# Patient Record
Sex: FEMALE | Race: WHITE | NOT HISPANIC OR LATINO | Employment: FULL TIME | URBAN - METROPOLITAN AREA
[De-identification: names, ages, dates, MRNs, and addresses within clinical notes are randomized per-mention and may not be internally consistent; named-entity substitution may affect disease eponyms.]

---

## 2021-11-15 ENCOUNTER — OFFICE VISIT (OUTPATIENT)
Dept: INTERNAL MEDICINE | Facility: CLINIC | Age: 31
End: 2021-11-15

## 2021-11-15 VITALS
OXYGEN SATURATION: 99 % | BODY MASS INDEX: 30.46 KG/M2 | HEART RATE: 72 BPM | SYSTOLIC BLOOD PRESSURE: 128 MMHG | HEIGHT: 68 IN | WEIGHT: 201 LBS | TEMPERATURE: 98.6 F | RESPIRATION RATE: 20 BRPM | DIASTOLIC BLOOD PRESSURE: 80 MMHG

## 2021-11-15 DIAGNOSIS — B37.31 VAGINAL CANDIDIASIS: ICD-10-CM

## 2021-11-15 DIAGNOSIS — F41.8 DEPRESSION WITH ANXIETY: Primary | ICD-10-CM

## 2021-11-15 PROBLEM — E10.9 TYPE 1 DIABETES: Status: ACTIVE | Noted: 2017-08-01

## 2021-11-15 PROBLEM — S83.206A RIGHT KNEE MENISCAL TEAR: Status: RESOLVED | Noted: 2019-02-13 | Resolved: 2021-11-15

## 2021-11-15 PROBLEM — S83.206A RIGHT KNEE MENISCAL TEAR: Status: ACTIVE | Noted: 2019-02-13

## 2021-11-15 LAB
BILIRUB BLD-MCNC: NEGATIVE MG/DL
CLARITY, POC: CLEAR
COLOR UR: YELLOW
EXPIRATION DATE: ABNORMAL
GLUCOSE UR STRIP-MCNC: ABNORMAL MG/DL
KETONES UR QL: ABNORMAL
LEUKOCYTE EST, POC: NEGATIVE
Lab: ABNORMAL
NITRITE UR-MCNC: NEGATIVE MG/ML
PH UR: 5 [PH] (ref 5–8)
PROT UR STRIP-MCNC: NEGATIVE MG/DL
RBC # UR STRIP: NEGATIVE /UL
SP GR UR: 1.03 (ref 1–1.03)
UROBILINOGEN UR QL: NORMAL

## 2021-11-15 PROCEDURE — 99203 OFFICE O/P NEW LOW 30 MIN: CPT | Performed by: NURSE PRACTITIONER

## 2021-11-15 PROCEDURE — 81003 URINALYSIS AUTO W/O SCOPE: CPT | Performed by: NURSE PRACTITIONER

## 2021-11-15 RX ORDER — FLUCONAZOLE 150 MG/1
TABLET ORAL
Qty: 2 TABLET | Refills: 0 | Status: SHIPPED | OUTPATIENT
Start: 2021-11-15 | End: 2021-12-20

## 2021-11-15 RX ORDER — DULOXETIN HYDROCHLORIDE 60 MG/1
CAPSULE, DELAYED RELEASE ORAL
COMMUNITY
Start: 2021-11-04 | End: 2021-11-15 | Stop reason: SDUPTHER

## 2021-11-15 RX ORDER — INSULIN ASPART 100 [IU]/ML
INJECTION, SOLUTION INTRAVENOUS; SUBCUTANEOUS
COMMUNITY
Start: 2012-11-02 | End: 2022-04-25

## 2021-11-15 RX ORDER — PROCHLORPERAZINE 25 MG/1
SUPPOSITORY RECTAL
COMMUNITY
Start: 2021-10-15

## 2021-11-15 RX ORDER — DULOXETIN HYDROCHLORIDE 60 MG/1
60 CAPSULE, DELAYED RELEASE ORAL DAILY
Qty: 90 CAPSULE | Refills: 1 | Status: SHIPPED | OUTPATIENT
Start: 2021-11-15 | End: 2022-02-14 | Stop reason: SDUPTHER

## 2021-11-15 NOTE — PROGRESS NOTES
Subjective    Candice Mckeon is a 31 y.o. female presenting today for   Chief Complaint   Patient presents with   • Establish Care   • Depression     questions about anti-depressants    • Urinary Frequency     burning x wk       History of Present Illness     Candice Mckeon presents today as a new patient to me to establish care.   Prior PCP was Dr. Augustine.  Patient Care Team:  Wilma Barragan APRN as PCP - General (Family Medicine)  Priyanka Ashby MD as Consulting Physician (Endocrinology)  Lucille Mcgarry MD as Consulting Physician (Obstetrics and Gynecology)    Current/chronic health conditions include:    Patient Active Problem List   Diagnosis   • Type 1 diabetes (HCC)   • Depression with anxiety         Current Outpatient Medications:   •  Continuous Blood Gluc Sensor (Dexcom G6 Sensor), CHANGE SENSOR EVERY 10 DAYS, Disp: , Rfl:   •  DULoxetine (CYMBALTA) 60 MG capsule, Take 1 capsule by mouth Daily., Disp: 90 capsule, Rfl: 1  •  insulin aspart (NovoLOG FlexPen) 100 UNIT/ML solution pen-injector sc pen, Inject  under the skin into the appropriate area as directed., Disp: , Rfl:   •  fluconazole (Diflucan) 150 MG tablet, Take one tablet by mouth at the first sign of yeast infection. May repeat in 3 days if symptoms persist., Disp: 2 tablet, Rfl: 0    Mixed anxiety and depression - She has taken medication since 9th grade. This involved panic attacks. Occurred shortly before dx of DM. She took Lexapro for a while and did well with this. Then experienced several deaths in a short period of time. Switched to Prozac. Didn't really notice a difference. Changed to Cymbalta by prior PCP. A lot of situational things that are causing more distress. She characterizes herself as usually enthusiastic. Lacking in motivation. She is not currently seeing a therapist although she has in the past.    New complaints today include:  She notes frequent vaginal candidiasis 2/2 DM. Sts she has been experiencing vaginal  "itching and irritation for approx 2wks.      The following portions of the patient's history were reviewed and updated as appropriate: allergies, current medications, problem list, past medical history, past surgical history, family history, and social history.     Review of Systems   Genitourinary: Positive for vaginal discharge.   Psychiatric/Behavioral: Positive for depressed mood and stress. The patient is nervous/anxious.        Objective    Vitals:    11/15/21 1025   BP: 128/80   Pulse: 72   Resp: 20   Temp: 98.6 °F (37 °C)   TempSrc: Temporal   SpO2: 99%   Weight: 91.2 kg (201 lb)   Height: 172.7 cm (67.99\")     Body mass index is 30.57 kg/m².  Nursing notes and vitals reviewed.    Physical Exam  Constitutional:       General: She is not in acute distress.     Appearance: She is well-developed.   Pulmonary:      Effort: Pulmonary effort is normal.   Neurological:      Mental Status: She is alert.   Psychiatric:         Attention and Perception: She is attentive.         Speech: Speech normal.         Recent Results (from the past 672 hour(s))   POCT urinalysis dipstick, automated    Collection Time: 11/15/21 10:46 AM    Specimen: Urine   Result Value Ref Range    Color Yellow Yellow, Straw, Dark Yellow, Lakisha    Clarity, UA Clear Clear    Specific Gravity  1.030 1.005 - 1.030    pH, Urine 5.0 5.0 - 8.0    Leukocytes Negative Negative    Nitrite, UA Negative Negative    Protein, POC Negative Negative mg/dL    Glucose,  mg/dL (A) Negative, 1000 mg/dL (3+) mg/dL    Ketones, UA Trace (A) Negative    Urobilinogen, UA Normal Normal    Bilirubin Negative Negative    Blood, UA Negative Negative    Lot Number [id number]     Expiration Date 10/31/2022          Assessment and Plan    Diagnoses and all orders for this visit:    1. Depression with anxiety (Primary)  Comments:  - I would like her to re-establish w/ therapist and provided a list of local/virtual providers.  Orders:  -     DULoxetine (CYMBALTA) 60 MG " capsule; Take 1 capsule by mouth Daily.  Dispense: 90 capsule; Refill: 1    2. Vaginal candidiasis  -     POCT urinalysis dipstick, automated  -     fluconazole (Diflucan) 150 MG tablet; Take one tablet by mouth at the first sign of yeast infection. May repeat in 3 days if symptoms persist.  Dispense: 2 tablet; Refill: 0              Medications, including side effects, were discussed with the patient. Patient verbalized understanding.  The plan of care was discussed. All questions were answered. Patient verbalized understanding.

## 2021-11-15 NOTE — PATIENT INSTRUCTIONS
Mental Health and Counseling Services:      [facility] or [facility] Direct - [email or web address]    [facility]:  [phone]; [email or web address]    Glendy Peñaover:  [phone]    Sofia Benson Dustin:  [phone]    Mayuri Chester:  [phone]    [facility]:  [phone]    [facility]:  [phone]    Counseling, Therapy and More, [phone]    [phone]    Rosa Elena Mccray:  [phone]    [phone]    [phone]    Liliana Archuleta:  [phone]    [facility]:  [phone]    [facility]:  [phone]    [phone]    [phone]    [phone]

## 2021-12-20 ENCOUNTER — TELEPHONE (OUTPATIENT)
Dept: INTERNAL MEDICINE | Facility: CLINIC | Age: 31
End: 2021-12-20

## 2021-12-20 DIAGNOSIS — B37.31 VAGINAL CANDIDIASIS: ICD-10-CM

## 2021-12-20 RX ORDER — FLUCONAZOLE 150 MG/1
TABLET ORAL
Qty: 2 TABLET | Refills: 5 | Status: SHIPPED | OUTPATIENT
Start: 2021-12-20 | End: 2022-02-09

## 2021-12-20 RX ORDER — FLUCONAZOLE 150 MG/1
TABLET ORAL
Qty: 2 TABLET | Refills: 0 | Status: SHIPPED | OUTPATIENT
Start: 2021-12-20 | End: 2021-12-20 | Stop reason: SDUPTHER

## 2022-02-14 DIAGNOSIS — F41.8 DEPRESSION WITH ANXIETY: ICD-10-CM

## 2022-02-14 RX ORDER — DULOXETIN HYDROCHLORIDE 60 MG/1
60 CAPSULE, DELAYED RELEASE ORAL DAILY
Qty: 90 CAPSULE | Refills: 1 | Status: SHIPPED | OUTPATIENT
Start: 2022-02-14 | End: 2022-11-18

## 2022-02-15 ENCOUNTER — OFFICE VISIT (OUTPATIENT)
Dept: INTERNAL MEDICINE | Facility: CLINIC | Age: 32
End: 2022-02-15

## 2022-02-15 VITALS
OXYGEN SATURATION: 99 % | HEART RATE: 107 BPM | SYSTOLIC BLOOD PRESSURE: 124 MMHG | DIASTOLIC BLOOD PRESSURE: 76 MMHG | BODY MASS INDEX: 31.87 KG/M2 | TEMPERATURE: 98.7 F | RESPIRATION RATE: 20 BRPM | HEIGHT: 68 IN | WEIGHT: 210.3 LBS

## 2022-02-15 DIAGNOSIS — H66.012 NON-RECURRENT ACUTE SUPPURATIVE OTITIS MEDIA OF LEFT EAR WITH SPONTANEOUS RUPTURE OF TYMPANIC MEMBRANE: Primary | ICD-10-CM

## 2022-02-15 PROCEDURE — 99213 OFFICE O/P EST LOW 20 MIN: CPT | Performed by: NURSE PRACTITIONER

## 2022-02-15 RX ORDER — PSEUDOEPHEDRINE HCL 120 MG/1
120 TABLET, FILM COATED, EXTENDED RELEASE ORAL EVERY 12 HOURS
Qty: 20 TABLET | Refills: 0 | Status: SHIPPED | OUTPATIENT
Start: 2022-02-15 | End: 2022-10-16

## 2022-02-15 RX ORDER — FLUTICASONE PROPIONATE 50 MCG
2 SPRAY, SUSPENSION (ML) NASAL DAILY
Qty: 16 G | Refills: 0 | Status: SHIPPED | OUTPATIENT
Start: 2022-02-15 | End: 2022-10-16

## 2022-02-15 NOTE — PROGRESS NOTES
"Subjective   Candice Mckeon is a 31 y.o. female presenting today for follow up of   Chief Complaint   Patient presents with   • Ear Problem     ruptured ear drum        History of Present Illness     Patient Active Problem List   Diagnosis   • Type 1 diabetes (HCC)   • Depression with anxiety       Outpatient Medications Marked as Taking for the 2/15/22 encounter (Office Visit) with Wilma Barragan APRN   Medication Sig Dispense Refill   • amoxicillin-clavulanate (AUGMENTIN) 875-125 MG per tablet Take 1 tablet by mouth 2 (Two) Times a Day for 10 days. 20 tablet 0   • Continuous Blood Gluc Sensor (Dexcom G6 Sensor) CHANGE SENSOR EVERY 10 DAYS     • DULoxetine (CYMBALTA) 60 MG capsule Take 1 capsule by mouth Daily. 90 capsule 1   • insulin aspart (NovoLOG FlexPen) 100 UNIT/ML solution pen-injector sc pen Inject  under the skin into the appropriate area as directed.     • ofloxacin (FLOXIN) 0.3 % otic solution Administer 5 drops into the left ear Daily for 7 days. 5 mL 0     Awoke overnight one week ago with worst pain of her life in her L ear. Went to [facility]. Dx was OM and she sts she was informed that TM was ruptured. Tx w/ Augmentin and Floxin otic. Pain has subsided. There is a constant ringing and pulsation. Her hearing is decreased.    Had been to [facility] the week prior w/ sinus/URI S&S. Was advised to tx w/ OTCs. Several family members ill w/ same.    The following portions of the patient's history were reviewed and updated as appropriate: allergies, current medications, past family history, past medical history, past social history, past surgical history and problem list.        Objective   Vitals:    02/15/22 1601   BP: 124/76   Pulse: 107   Resp: 20   Temp: 98.7 °F (37.1 °C)   TempSrc: Temporal   SpO2: 99%   Weight: 95.4 kg (210 lb 4.8 oz)   Height: 172.7 cm (67.99\")       BP Readings from Last 3 Encounters:   02/15/22 124/76   02/09/22 140/89   11/15/21 128/80        Wt Readings from Last 3 Encounters: "   02/15/22 95.4 kg (210 lb 4.8 oz)   02/09/22 95.3 kg (210 lb)   11/15/21 91.2 kg (201 lb)        Body mass index is 31.98 kg/m².  Nursing notes and vitals reviewed.    Physical Exam  Constitutional:       General: She is not in acute distress.     Appearance: She is well-developed.   HENT:      Right Ear: Hearing, tympanic membrane, ear canal and external ear normal.      Left Ear: Ear canal and external ear normal. A middle ear effusion is present. Tympanic membrane is bulging. Tympanic membrane is not perforated or erythematous.      Nose: Mucosal edema present.      Left Turbinates: Enlarged and swollen.      Right Sinus: No maxillary sinus tenderness or frontal sinus tenderness.      Left Sinus: No maxillary sinus tenderness or frontal sinus tenderness.      Mouth/Throat:      Mouth: Mucous membranes are moist.      Pharynx: Oropharynx is clear. Uvula midline.   Neck:      Thyroid: No thyroid mass or thyromegaly.   Cardiovascular:      Rate and Rhythm: Regular rhythm.      Pulses: Normal pulses.      Heart sounds: S1 normal and S2 normal. No murmur heard.  No friction rub. No gallop.    Pulmonary:      Effort: Pulmonary effort is normal.      Breath sounds: Normal breath sounds. No wheezing, rhonchi or rales.   Musculoskeletal:      Cervical back: Neck supple.   Lymphadenopathy:      Cervical: No cervical adenopathy.   Neurological:      Mental Status: She is alert and oriented to person, place, and time.      Cranial Nerves: No cranial nerve deficit.      Sensory: No sensory deficit.   Psychiatric:         Attention and Perception: She is attentive.         Speech: Speech normal.         Behavior: Behavior normal.         No results found for this or any previous visit (from the past 672 hour(s)).      Assessment/Plan   Diagnoses and all orders for this visit:    1. Non-recurrent acute suppurative otitis media of left ear with spontaneous rupture of tympanic membrane (Primary)  Comments:  - perforation appears  closed  - finish Augmentin  Orders:  -     pseudoephedrine (Sudafed 12 Hour) 120 MG 12 hr tablet; Take 1 tablet by mouth Every 12 (Twelve) Hours.  Dispense: 20 tablet; Refill: 0  -     fluticasone (Flonase) 50 MCG/ACT nasal spray; 2 sprays into the nostril(s) as directed by provider Daily.  Dispense: 16 g; Refill: 0              Medications, including side effects, were discussed with the patient. Patient verbalized understanding.  The plan of care was discussed. All questions were answered. Patient verbalized understanding.      Will refer to ENT if S&S persist.

## 2022-02-21 ENCOUNTER — TELEPHONE (OUTPATIENT)
Dept: INTERNAL MEDICINE | Facility: CLINIC | Age: 32
End: 2022-02-21

## 2022-02-21 NOTE — TELEPHONE ENCOUNTER
Caller: Candice Mckeon    Relationship: Self    Best call back number: [phone]    What is the medical concern/diagnosis: LEFT EAR CLOGGED    What specialty or service is being requested: ENT      What is the office location: SOMEONE NEAR [place] OR [place] WOULD BE BEST      Any additional details: PATIENT STATES THAT HER EAR IS NOT ANY BETTER AND SHE WOULD LIKE THE REFERRAL DISCUSSED AT HER LAST APPOINTMENT    PLEASE CALL AND ADVISE

## 2022-02-22 DIAGNOSIS — H91.92 HEARING LOSS OF LEFT EAR, UNSPECIFIED HEARING LOSS TYPE: ICD-10-CM

## 2022-02-22 DIAGNOSIS — H66.012 NON-RECURRENT ACUTE SUPPURATIVE OTITIS MEDIA OF LEFT EAR WITH SPONTANEOUS RUPTURE OF TYMPANIC MEMBRANE: Primary | ICD-10-CM

## 2022-08-31 ENCOUNTER — TELEPHONE (OUTPATIENT)
Dept: INTERNAL MEDICINE | Facility: CLINIC | Age: 32
End: 2022-08-31

## 2022-09-01 ENCOUNTER — TELEPHONE (OUTPATIENT)
Dept: INTERNAL MEDICINE | Facility: CLINIC | Age: 32
End: 2022-09-01

## 2022-09-01 NOTE — TELEPHONE ENCOUNTER
Pt is an insulin-dependent diabetic and would likely qualify for Paxlovid. I went ahead and scheduled her for a video visit at 1530 to discuss. Please call and make her aware of her appt and instruct her on how to access that.

## 2022-09-01 NOTE — TELEPHONE ENCOUNTER
Hub to share with pt, I called and left vm on pt phone in regards to her video visit today at 1530 with Wilma to discuss Paxlovid treatment for Covid.

## 2022-11-18 DIAGNOSIS — F41.8 DEPRESSION WITH ANXIETY: ICD-10-CM

## 2022-11-18 RX ORDER — DULOXETIN HYDROCHLORIDE 60 MG/1
CAPSULE, DELAYED RELEASE ORAL
Qty: 90 CAPSULE | Refills: 1 | Status: SHIPPED | OUTPATIENT
Start: 2022-11-18

## 2022-11-18 NOTE — TELEPHONE ENCOUNTER
Rx Refill Note  Requested Prescriptions     Pending Prescriptions Disp Refills    DULoxetine (CYMBALTA) 60 MG capsule [Pharmacy Med Name: DULOXETINE HCL DR 60 MG CAP] 90 capsule 1     Sig: TAKE 1 CAPSULE BY MOUTH EVERY DAY      Last office visit with prescribing clinician: 2/15/2022      Next office visit with prescribing clinician: Visit date not found            Kay Mason MA  11/18/22, 14:26 EST

## 2023-05-17 DIAGNOSIS — F41.8 DEPRESSION WITH ANXIETY: ICD-10-CM

## 2023-05-17 RX ORDER — DULOXETIN HYDROCHLORIDE 60 MG/1
CAPSULE, DELAYED RELEASE ORAL
Qty: 90 CAPSULE | Refills: 1 | OUTPATIENT
Start: 2023-05-17

## 2023-05-18 ENCOUNTER — TELEPHONE (OUTPATIENT)
Dept: INTERNAL MEDICINE | Facility: CLINIC | Age: 33
End: 2023-05-18
Payer: COMMERCIAL

## 2023-05-18 DIAGNOSIS — F41.8 DEPRESSION WITH ANXIETY: ICD-10-CM

## 2023-05-18 RX ORDER — DULOXETIN HYDROCHLORIDE 60 MG/1
60 CAPSULE, DELAYED RELEASE ORAL DAILY
Qty: 90 CAPSULE | Refills: 0 | Status: SHIPPED | OUTPATIENT
Start: 2023-05-18

## 2023-05-18 RX ORDER — DULOXETIN HYDROCHLORIDE 60 MG/1
60 CAPSULE, DELAYED RELEASE ORAL DAILY
Qty: 90 CAPSULE | Refills: 0 | OUTPATIENT
Start: 2023-05-18

## 2023-05-18 NOTE — TELEPHONE ENCOUNTER
OK FOR HUB TO READ  LVM FOR PATIENT TO CALL IN.  WE RECEIVED A MED FILL REQUEST BUT HAS BEEN OVER A YEAR SINCE BEING SEEN. PATIENT NEEDS A APPT FOR ANNUAL PHYSICAL.

## 2023-05-18 NOTE — TELEPHONE ENCOUNTER
HUB RELAYED MESSAGE. SOONEST AVAILABLE APPOINTMENT SCHEDULED. REFILL REQUEST SENT TO GET MEDICATION TO LAST UNTIL PHYSICAL

## 2023-05-18 NOTE — TELEPHONE ENCOUNTER
Caller: Mike Candice E    Relationship: Self    Best call back number: [phone]    Requested Prescriptions:   Requested Prescriptions     Pending Prescriptions Disp Refills   • DULoxetine (CYMBALTA) 60 MG capsule 90 capsule 0     Sig: Take 1 capsule by mouth Daily.        Pharmacy where request should be sent: [facility]ACY #[id number] - [facility] [address] [address] AT [facility] [address] [address] - [phone]  - [phone]      Last office visit with prescribing clinician: 2/15/2022   Last telemedicine visit with prescribing clinician: 5/18/2023   Next office visit with prescribing clinician: 6/13/2023     Additional details provided by patient: PATIENT SCHEDULED SOONEST AVAILABLE PHYSICAL, BUT WILL NEED A REFILL OF MEDICATION BEFORE THAT DATE    Does the patient have less than a 3 day supply:  [] Yes  [x] No    Would you like a call back once the refill request has been completed: [] Yes [x] No    If the office needs to give you a call back, can they leave a voicemail: [] Yes [x] No    Christiano Gutierrez Rep   05/18/23 11:39 EDT

## 2023-06-13 ENCOUNTER — OFFICE VISIT (OUTPATIENT)
Dept: INTERNAL MEDICINE | Facility: CLINIC | Age: 33
End: 2023-06-13
Payer: COMMERCIAL

## 2023-06-13 VITALS
WEIGHT: 211.4 LBS | TEMPERATURE: 98 F | HEIGHT: 68 IN | OXYGEN SATURATION: 96 % | BODY MASS INDEX: 32.04 KG/M2 | HEART RATE: 107 BPM

## 2023-06-13 DIAGNOSIS — F41.8 DEPRESSION WITH ANXIETY: Primary | Chronic | ICD-10-CM

## 2023-06-13 PROCEDURE — 99214 OFFICE O/P EST MOD 30 MIN: CPT | Performed by: NURSE PRACTITIONER

## 2023-06-13 NOTE — PROGRESS NOTES
"Subjective   Candice Mckeon is a 33 y.o. female presenting today for follow up of   Chief Complaint   Patient presents with    Follow-up    Depression    Anxiety         Outpatient Medications Marked as Taking for the 6/13/23 encounter (Office Visit) with Wilma Barragan APRN   Medication Sig Dispense Refill    Continuous Blood Gluc Sensor (Dexcom G6 Sensor) CHANGE SENSOR EVERY 10 DAYS      Continuous Blood Gluc Transmit (Dexcom G6 Transmitter) misc       DULoxetine (CYMBALTA) 60 MG capsule Take 1 capsule by mouth Daily. 90 capsule 0    Insulin Disposable Pump (Omnipod 5 G6 Intro, Gen 5,) kit       NovoLOG 100 UNIT/ML injection          Presents for f/u r/t depression and anxiety.  from spouse in Jan but continues to co-habitate. Meeting with a therapist monthly but feels this needs to be more often. Prefers in-person meetings. She feels like Cymbalta is ok.      The following portions of the patient's history were reviewed and updated as appropriate: allergies, current medications, past family history, past medical history, past social history, past surgical history and problem list.    Review of Systems   Psychiatric/Behavioral:  Positive for depressed mood and stress. Negative for self-injury and suicidal ideas. The patient is nervous/anxious.      Objective   Vitals:    06/13/23 1128   Pulse: 107   Temp: 98 °F (36.7 °C)   TempSrc: Infrared   SpO2: 96%   Weight: 95.9 kg (211 lb 6.4 oz)   Height: 172.7 cm (67.99\")       BP Readings from Last 3 Encounters:   10/16/22 136/89   09/12/22 135/87   04/25/22 135/91        Wt Readings from Last 3 Encounters:   06/13/23 95.9 kg (211 lb 6.4 oz)   10/16/22 95.3 kg (210 lb)   09/12/22 95.3 kg (210 lb)        Body mass index is 32.15 kg/m².  Nursing notes and vitals reviewed.    Physical Exam  Constitutional:       General: She is not in acute distress.     Appearance: She is well-developed.   Pulmonary:      Effort: Pulmonary effort is normal.   Neurological:     "  Mental Status: She is alert.   Psychiatric:         Attention and Perception: She is attentive.         Mood and Affect: Affect is tearful.         Speech: Speech normal.       No results found for this or any previous visit (from the past 672 hour(s)).      Assessment & Plan   Diagnoses and all orders for this visit:    1. Depression with anxiety (Primary)      Continue Cymbalta.   Gave material about [facility].  Inquire w/ therapist regarding more frequent sessions.        Medications, including side effects, were discussed with the patient. Patient verbalized understanding.  The plan of care was discussed. All questions were answered. Patient verbalized understanding.        I spent 36 minutes caring for aCndice on this date of service. This time includes time spent by me in the following activities:preparing for the visit, counseling and educating the patient/family/caregiver, documenting information in the medical record, and care coordination

## 2023-08-07 ENCOUNTER — OFFICE VISIT (OUTPATIENT)
Dept: INTERNAL MEDICINE | Facility: CLINIC | Age: 33
End: 2023-08-07
Payer: COMMERCIAL

## 2023-08-07 VITALS
HEIGHT: 68 IN | TEMPERATURE: 98.2 F | SYSTOLIC BLOOD PRESSURE: 132 MMHG | HEART RATE: 111 BPM | OXYGEN SATURATION: 98 % | DIASTOLIC BLOOD PRESSURE: 90 MMHG | WEIGHT: 193.6 LBS | BODY MASS INDEX: 29.34 KG/M2

## 2023-08-07 DIAGNOSIS — F41.0 PANIC ATTACKS: ICD-10-CM

## 2023-08-07 DIAGNOSIS — F41.1 GENERALIZED ANXIETY DISORDER: Primary | ICD-10-CM

## 2023-08-07 PROCEDURE — 99214 OFFICE O/P EST MOD 30 MIN: CPT | Performed by: NURSE PRACTITIONER

## 2023-08-07 RX ORDER — HYDROXYZINE HYDROCHLORIDE 25 MG/1
25 TABLET, FILM COATED ORAL NIGHTLY PRN
Qty: 90 TABLET | Refills: 0 | Status: SHIPPED | OUTPATIENT
Start: 2023-08-07

## 2023-08-07 RX ORDER — INSULIN ASPART 100 [IU]/ML
INJECTION, SOLUTION INTRAVENOUS; SUBCUTANEOUS
COMMUNITY
Start: 2023-06-28 | End: 2023-08-07 | Stop reason: SDUPTHER

## 2023-08-07 RX ORDER — BUSPIRONE HYDROCHLORIDE 5 MG/1
5 TABLET ORAL 3 TIMES DAILY PRN
Qty: 90 TABLET | Refills: 0 | Status: SHIPPED | OUTPATIENT
Start: 2023-08-07

## 2023-08-07 RX ORDER — INSULIN PMP CART,AUT,G6/7,CNTR
EACH SUBCUTANEOUS
COMMUNITY
Start: 2023-07-19 | End: 2023-08-07 | Stop reason: SDUPTHER

## 2023-08-07 NOTE — PROGRESS NOTES
"Subjective   Candice Mckeon is a 33 y.o. female presenting today for follow up of   Chief Complaint   Patient presents with    Panic Attack     Having panic attacks during the night.        Panic Attack       Outpatient Medications Marked as Taking for the 8/7/23 encounter (Office Visit) with Wilma Barragan APRN   Medication Sig Dispense Refill    Continuous Blood Gluc Sensor (Dexcom G6 Sensor) CHANGE SENSOR EVERY 10 DAYS      Continuous Blood Gluc Transmit (Dexcom G6 Transmitter) misc       DULoxetine (CYMBALTA) 60 MG capsule Take 1 capsule by mouth Daily. 90 capsule 0    Insulin Disposable Pump (Omnipod 5 G6 Intro, Gen 5,) kit       NovoLOG 100 UNIT/ML injection          Presents for exacerbation of GENA. Having panic attacks, waking her from sleep.  Meeting w/ counselor.      The following portions of the patient's history were reviewed and updated as appropriate: allergies, current medications, past family history, past medical history, past social history, past surgical history and problem list.    Review of Systems   Psychiatric/Behavioral:  Negative for suicidal ideas.      Objective   Vitals:    08/07/23 1103   BP: 132/90   BP Location: Left arm   Patient Position: Sitting   Cuff Size: Adult   Pulse: 111   Temp: 98.2 øF (36.8 øC)   TempSrc: Infrared   SpO2: 98%   Weight: 87.8 kg (193 lb 9.6 oz)   Height: 172.7 cm (67.99\")       BP Readings from Last 3 Encounters:   08/07/23 132/90   10/16/22 136/89   09/12/22 135/87        Wt Readings from Last 3 Encounters:   08/07/23 87.8 kg (193 lb 9.6 oz)   06/13/23 95.9 kg (211 lb 6.4 oz)   10/16/22 95.3 kg (210 lb)        Body mass index is 29.45 kg/mý.  Nursing notes and vitals reviewed.    Physical Exam  Constitutional:       General: She is not in acute distress.     Appearance: She is well-developed.   Pulmonary:      Effort: Pulmonary effort is normal.   Neurological:      Mental Status: She is alert.   Psychiatric:         Attention and Perception: She is " attentive.         Mood and Affect: Affect is tearful.         Speech: Speech normal.         Behavior: Behavior is agitated.       No results found for this or any previous visit (from the past 672 hour(s)).      Assessment & Plan   Diagnoses and all orders for this visit:    1. Generalized anxiety disorder (Primary)  -     hydrOXYzine (ATARAX) 25 MG tablet; Take 1 tablet by mouth At Night As Needed (panic attacks, insomnia).  Dispense: 90 tablet; Refill: 0  -     busPIRone (BUSPAR) 5 MG tablet; Take 1 tablet by mouth 3 (Three) Times a Day As Needed (anxiety; panic attack).  Dispense: 90 tablet; Refill: 0    2. Panic attacks  -     hydrOXYzine (ATARAX) 25 MG tablet; Take 1 tablet by mouth At Night As Needed (panic attacks, insomnia).  Dispense: 90 tablet; Refill: 0  -     busPIRone (BUSPAR) 5 MG tablet; Take 1 tablet by mouth 3 (Three) Times a Day As Needed (anxiety; panic attack).  Dispense: 90 tablet; Refill: 0              Medications, including side effects, were discussed with the patient. Patient verbalized understanding.  The plan of care was discussed. All questions were answered. Patient verbalized understanding.

## 2023-08-16 DIAGNOSIS — F41.8 DEPRESSION WITH ANXIETY: ICD-10-CM

## 2023-08-16 RX ORDER — DULOXETIN HYDROCHLORIDE 60 MG/1
CAPSULE, DELAYED RELEASE ORAL
Qty: 90 CAPSULE | Refills: 0 | Status: SHIPPED | OUTPATIENT
Start: 2023-08-16

## 2023-08-16 NOTE — TELEPHONE ENCOUNTER
Rx Refill Note  Requested Prescriptions     Pending Prescriptions Disp Refills    DULoxetine (CYMBALTA) 60 MG capsule [Pharmacy Med Name: DULOXETINE HCL DR 60 MG CAP] 90 capsule 0     Sig: TAKE 1 CAPSULE BY MOUTH EVERY DAY      Last office visit with prescribing clinician: 8/7/2023   Last telemedicine visit with prescribing clinician: Visit date not found   Next office visit with prescribing clinician: Visit date not found                         Would you like a call back once the refill request has been completed: [] Yes [] No    If the office needs to give you a call back, can they leave a voicemail: [] Yes [] No    Shira Mclaughlin, PCT  08/16/23, 08:32 EDT

## 2023-09-02 DIAGNOSIS — F41.0 PANIC ATTACKS: ICD-10-CM

## 2023-09-02 DIAGNOSIS — F41.1 GENERALIZED ANXIETY DISORDER: ICD-10-CM

## 2023-09-05 RX ORDER — BUSPIRONE HYDROCHLORIDE 5 MG/1
TABLET ORAL
Qty: 90 TABLET | Refills: 0 | Status: SHIPPED | OUTPATIENT
Start: 2023-09-05

## 2023-09-05 NOTE — TELEPHONE ENCOUNTER
Rx Refill Note  Requested Prescriptions     Pending Prescriptions Disp Refills    busPIRone (BUSPAR) 5 MG tablet [Pharmacy Med Name: busPIRone HCL 5 MG TABLET] 90 tablet 0     Sig: TAKE ONE TABLET BY MOUTH THREE TIMES A DAY AS NEEDED FOR ANXIETY; PANIC ATTACK      Last office visit with prescribing clinician: 8/7/2023   Last telemedicine visit with prescribing clinician: Visit date not found   Next office visit with prescribing clinician: Visit date not found                         Would you like a call back once the refill request has been completed: [] Yes [] No    If the office needs to give you a call back, can they leave a voicemail: [] Yes [] No    Sandhya Narvaez MA  09/05/23, 13:56 EDT

## 2023-10-12 ENCOUNTER — E-VISIT (OUTPATIENT)
Dept: FAMILY MEDICINE CLINIC | Facility: TELEHEALTH | Age: 33
End: 2023-10-12
Payer: COMMERCIAL

## 2023-10-12 RX ORDER — FLUTICASONE PROPIONATE 50 MCG
2 SPRAY, SUSPENSION (ML) NASAL DAILY
Qty: 16 G | Refills: 0 | Status: SHIPPED | OUTPATIENT
Start: 2023-10-12

## 2023-10-12 RX ORDER — BROMPHENIRAMINE MALEATE, PSEUDOEPHEDRINE HYDROCHLORIDE, AND DEXTROMETHORPHAN HYDROBROMIDE 2; 30; 10 MG/5ML; MG/5ML; MG/5ML
10 SYRUP ORAL 4 TIMES DAILY PRN
Qty: 280 ML | Refills: 0 | Status: SHIPPED | OUTPATIENT
Start: 2023-10-12

## 2023-10-12 NOTE — EXTERNAL PATIENT INSTRUCTIONS
Note   I have prescribed you the following: Bromfed- this is a syrup to help with congestion, runny nose and sinus symptoms. Flonase- this is a nasal steroid, it will not affect your blood sugar since it is topical. This will help with sinus inflammation, congestion, runny nose and drainage. Sinusitis requiring antibiotics is generally diagnosed in the following situations: ? No symptom improvement after 10 days ? Onset of high fever and purulent nasal drainage, or facial pain for more than 3-4 days ? Worsening symptoms following a viral upper respiratory virus that was initially improving Because you report having symptoms for only 3 days and no fever it is unlikely that you need an antibiotic at this time. Majority of cases are viral or allergy related and do not require antibiotics at all.   Diagnosis   Common cold   My name is PEDRO LUIS Gonzalez, and I'm a healthcare provider at [facility]. I reviewed your interview, and I see that you may have a cold.   With the ongoing COVID-19 pandemic, it can be hard to tell the difference between the common cold and a COVID-19 infection. Many cold symptoms are the same as COVID-19 symptoms. Most people with either illness have mild to moderate symptoms and can rest at home until they get better.   To prevent the spread of illness to others, I recommend that you stay home and away from other people as much as possible while you're sick. When you need to be around other people, consider wearing a face mask.   Here are the main things to know about your current symptoms:    Colds get better on their own.    You can use the medications recommended here to help ease your symptoms.   Based on what you told me in your interview, I haven't prescribed antibiotics. Antibiotics fight bacteria, not viruses. They don't help when you have a viral infection like the common cold. Antibiotics could even make you feel worse as they can cause or worsen nausea, diarrhea, and stomach  pain. The following factors suggest that a virus is causing your symptoms:    You've had symptoms for less than 10 days. A bacterial infection is suspected when you've had symptoms longer than 10 days without improvement.    You don't have sinus pain.    You haven't had a fever. Bacterial infections can cause a high fever.    Your nasal discharge is thin.    Your glands/lymph nodes are swollen or tender to the touch. Swollen lymph nodes are common with viral conditions like yours.    In addition to your sore throat, you have other cold symptoms like a stuffy nose or cough. This suggests a viral infection, rather than a bacterial infection such as strep throat.   About your diagnosis   The common cold is a viral infection of the respiratory tract, which includes the nose, throat, breathing passages, and lungs. These are the key things to know about colds:    There is no vaccine to prevent colds and no cure for a cold.    Some medications can lessen your symptoms.    You can spread a cold to other people.    Over 200 different viruses can cause the common cold. That's why you can get another cold after you've just had one.   Common symptoms include low-grade fever, sneezing, runny nose, congestion, sore throat, and cough. You may also notice fatigue, body aches, difficulty sleeping, or decreased appetite.   What to expect   You should feel better within 5 to 7 days and be back to normal within 14 days.   When to seek care   Call us at [phone]   with any sudden or unexpected symptoms.    Fever that measures over 103F or continues for more than 3 days.    Your headache worsens.    Your throat pain worsens and makes swallowing extremely difficult or impossible.    Hoarse voice or loss of voice lasting longer than 2 weeks.    Your sinus pain continues for 10 days or more, without improvement.    More than 5 episodes of diarrhea in a day.    More than 5 episodes of vomiting in a day.    Coughing up red or bloody  mucus.    Severe shortness of breath.    Severe stomach pain.    Symptoms that get better for a few days, and then suddenly get worse.    Severe chest pain   Other treatment    Rest! Your body needs rest to recover and fight the virus.    Drink plenty of water to stay hydrated.    Try non-prescription saline nasal sprays to help your nasal symptoms.    If your nose or sinuses become very stuffy, try using a Neti Pot to flush them out. Neti Pots are available at any drugstore without a prescription.    Gargle with salt water several times a day to help your throat feel better. Cough drops and throat lozenges may provide extra relief. A teaspoon of honey stirred into warm water or weak tea can help soothe a sore throat and cough.    Avoid smoke and air pollution. Smoke can make infections worse.   Prevention    Avoid close contact with other people when you're sick.    Cover your mouth and nose when you cough or sneeze. Use a tissue or cough into your elbow. Make sure that used tissues go directly into the trash.    Avoid touching your eyes, nose, or mouth while you're sick.    Wash your hands often, especially after coughing, sneezing, or blowing your nose. If soap and water are not available, use an alcohol-based hand .    If you or someone in your home or workplace is sick, disinfect commonly used items. This includes door handles, tables, computers, remotes, and pens.    Coronavirus (COVID-19) information   Common symptoms of COVID-19 include fever, cough, shortness of breath, fatigue, muscle or body aches, headaches, new loss of sense of taste or smell, sore throat, stuffy or runny nose, nausea or vomiting, and diarrhea. Most people who get COVID-19 have mild symptoms and can rest at home until they get better. Elderly people and those with chronic medical problems may be at risk for more serious complications.   FAQs about the COVID-19 vaccine   There are four authorized COVID-19 vaccines: Sarath &  Sarath's Louise Vaccine (J&J/Louise), Moderna, Novavax, and Pfizer-Student Designed (Pfizer). The J&J/Louise and Novavax vaccines are approved for use in people aged 18 and older. The Moderna and Pfizer vaccines are approved for those aged 6 months and older. All four are available at no cost. Even if you don't have health insurance, you can still get the COVID-19 vaccine for free.   Which vaccine is the best? Which vaccine should I get?   All four vaccines are highly effective. Even if you get COVID-19 after being vaccinated, all of the vaccines help prevent severe disease, hospitalization, and complications.   Most people should get whichever vaccine is first available to them. However, women younger than 50 years old should consider the rare risk of blood clots with low platelets after vaccination with the J&J/Louise vaccine. This risk hasn't been seen with the other three vaccines.   Are the vaccines safe?   Yes. Hundreds of millions of people in the [place] have already safely received COVID-19 vaccines under the most intense safety monitoring in the history of the [place].   Do I need the vaccine if I've already had COVID?   Yes. Vaccination helps protect you even if you've already had COVID.   If you had COVID-19 and had symptoms, wait to get vaccinated until you've recovered and completed your isolation period.   If you tested positive for COVID-19 but did not have symptoms, you can get vaccinated after 5 full days have passed since you had a positive test, as long as you don't develop symptoms.   How many doses of the vaccine do I need?   Visit www.cdc.gov/coronavirus/2019-ncov/vaccines/stay-up-to-date.html   to find out how to stay up to date with your COVID-19 vaccines.   I'm immunocompromised. How many doses of the vaccine do I need?   For information on how immunocompromised people can stay up to date with their COVID-19 vaccines, visit www.cdc.gov/coronavirus/2019-ncov/vaccines/recommendations/immuno.html  .   What  are the common side effects of the vaccine?   A sore arm, tiredness, headache, and muscle pain may occur within two days of getting the vaccine and last a day or two. For the Moderna or Pfizer vaccines, side effects are more common after the second dose. People over the age of 55 are less likely to have side effects than younger people.   After I'm up to date on vaccines, can I still get or spread COVID?   Yes, you can still get COVID, but your disease should be milder. And your risk of serious illness, hospitalization, and complications will be much lower, especially if you're up to date. Unfortunately, you can still spread COVID if you've been vaccinated. That's why it's important to follow isolation guidelines if you get sick or test positive.   After I'm up to date on vaccines, can I go back to normal?   You should still wear a mask indoors in public if:    It's required by laws, rules, regulations, or local guidance.    You have a weakened immune system.    Your age puts you at increased risk of severe disease.    You have a medical condition that puts you at increased risk of severe disease.    Someone in your household has a weakened immune system, is at increased risk for severe disease, or is unvaccinated.    You're in an area of high transmission.   Where can I get a COVID-19 vaccine?   Visit [facility]'s website for more information. To find a COVID-19 vaccination site near you, visit www.vaccines.gov/  , call [phone]  , or text your zip code to 167253 ([place]). Message and data rates may apply.   What about travel?   Travel increases your risk of exposure to COVID-19. For more information, see www.cdc.gov/coronavirus/2019-ncov/travelers/index.html  .   I've had close contact with someone who has COVID. Do I need to quarantine, and if so, for how long?   For the most current answer, including a calculator to determine whether you need to stay home and for how long, visit  www.cdc.gov/coronavirus/2019-ncov/your-health/quarantine-isolation.html  .   I've tested positive for COVID. How long do I need to isolate?   For the latest recommendations, including a calculator to determine how long you need to stay home, visit www.cdc.gov/coronavirus/2019-ncov/your-health/quarantine-isolation.html  .   What if I develop symptoms that might be from COVID?   For the latest recommendations on what to do if you're sick, including when to seek emergency care, visit www.cdc.gov/coronavirus/2019-ncov/if-you-are-sick/steps-when-sick.html  .    Flu vaccine information   Who should get a flu vaccine?   Everyone 6 months of age and older should get a yearly flu vaccine.   When should I get vaccinated?   You should get a flu vaccine by the end of October. Once you're vaccinated, it takes about two weeks for antibodies to develop and protect you against the flu. That's why it's important to get vaccinated as soon as possible.   After October, is it too late to get vaccinated?   No. You should still get vaccinated. As long as the flu viruses are still in your community, flu vaccines will remain available, even into January of next year or later.   Why do I need a flu vaccine EVERY year?   Flu viruses are constantly changing, so flu vaccines are usually updated from one season to the next. Your protection from the flu vaccine also lessens over time.   Is the flu vaccine safe?   Yes. Over the last 50 years, hundreds of millions of Americans have safely received the flu vaccines.   What are the side effects of flu vaccines?   You CANNOT get the flu from a flu vaccine. Common side effects of the flu shot include soreness, redness and/or swelling where the shot was given, low grade fever, and aches. Common side effects of the nasal spray flu vaccine for adults include runny nose, headaches, sore throat, and cough. For children, side effects include wheezing, vomiting, muscle aches, and fever.   Does the flu  vaccine increase your risk of getting COVID-19?   No. There is no evidence that getting a flu vaccine increases your risk of getting COVID-19.   Is it safe to get the flu vaccine along with a COVID-19 vaccine?   Yes. It's safe to get the flu vaccine with a COVID-19 vaccine or booster.   Contact your healthcare provider TODAY for details on when and where to get your flu vaccine.   Your provider   Your diagnosis was provided by PEDRO LUIS Gonzalez, a member of your trusted care team at [facility].   If you have any questions, call us at [phone]  .

## 2023-10-12 NOTE — E-VISIT TREATED
Chief Complaint: Cold, flu, COVID, sinus, hay fever, or seasonal allergies   Patient introduction   Patient is 33-year-old female with nasal discharge, sore throat, and headache that started less than 48 hours ago. Regarding date of symptom onset, patient writes: 10/09/23.   COVID-19 testing history, vaccination status, and exposure:    Has not been tested for COVID-19 since symptom onset.    Patient took a self-test during the interview. Test result was negative.    Received 2 doses of the COVID-19 vaccine (Pfizer, Pfizer). Received their most recent dose of the vaccine more than 14 days ago.    Close contact 3 to 7 days ago with a person with a confirmed diagnosis of COVID-19.    No travel outside local community within the last 14 days.   Risk factors for severe disease from COVID-19 infection    BMI >= 25.    Diabetes.    An unspecified autoimmune disorder.   General presentation   Symptoms came on gradually.   Fever:    No fever.   Sinus and nasal symptoms:    Nasal discharge.    Yellow nasal drainage.    Nasal drainage is thin.    1 to 3 episodes of antibiotic treatment for sinus infection in the last year.    Sinus pain or pressure on or around the forehead.    Patient first noticed sinus pain less than 5 days ago.    Sinus pain is not worse with Valsalva.    No nasal or sinus congestion.    No itchy nose or sneezing.    No postnasal drip.    No history of unhealed nasal septal ulcer/nasal wound.    No history of deviated septum or nasal polyps.   Throat symptoms:    Sore throat.    Previous tonsillectomy.    Has discomfort when swallowing but can swallow liquids and solid foods.   Head and body aches:    Headache described as mild (1 to 3 on a scale of 1 to 10).    No sweats.    No chills.    No myalgia.    No fatigue.   Cough:    No cough.   Wheezing and shortness of breath:    No COPD diagnosis.    No asthma diagnosis.    No wheezing.    No shortness of breath.   Chest pain:    No chest pain.   Ear  symptoms:    Current symptoms include pressure and fullness in the ear(s).   Dizziness:    No dizziness.   Allergies:    No history of allergies.   Flu exposure:    No recent known exposure to a person with a confirmed flu diagnosis.    Received a flu vaccine in the last 2 weeks.   Patient is taking over-the-counter medications for current symptoms, including acetaminophen.   Review of red flags/alarm symptoms:    No changes in alertness or awareness.    No nuchal rigidity.    No symptoms suggesting airway obstruction.    No symptoms suggesting intracranial hemorrhage.    No decreased urination.   Risk factors for antibiotic resistance:    Diabetes.   Pregnancy/menstrual status/breastfeeding:    Not pregnant.    Not breastfeeding.    Regarding date of last menstrual period, patient writes: Last week.   Self-exam:    Height: 172 centimeters    Weight: 88.4 kilograms    No difficulty moving their chin toward their chest.    No palatal petechiae.    Swollen/painful neck lymph nodes.   Recent antibiotic use:    Has not taken antibiotics for similar symptoms within the past month.   Current medications   Currently taking DULoxetine 60 MG capsule, Dexcom G6 Sensor, busPIRone 5 MG tablet, Dexcom G6 Transmitter misc, NovoLOG 100 UNIT/ML injection, and FLUORIDE/IRON/VITAMIN A/VITAMIN C/VITAMIN D.   Medication allergies   No relevant drug allergies.   Medication contraindication review   Patient has history of depression and diabetes. Therefore, the following medication(s) will not be prescribed:    Metoclopramide.    Pseudoephedrine.   No history of metoclopramide-associated dystonic reaction and tardive dyskinesia.   No known history of amoxicillin-clavulanate-associated cholestatic jaundice or hepatic impairment.   No known history of azithromycin-associated cholestatic jaundice or hepatic impairment.   Past medical history   Immune conditions: Regarding an autoimmune disorder they have, patient writes: Type one diabetes.  Patient takes medications regularly for their condition(s).   No history of cancer.   Social history   Never smoked tobacco.   Patient-submitted comments   Patient was asked if they had anything to add about their symptoms. Patient writes: I get this dull ache in my ears when I either am getting an ear infection or sinus infection. I had the flu shot on Friday, October 6th. I am allergic to sulfa drugs and cannot take steroids because of my diabetes. .   Patient did not request an excuse note.   Assessment   Acute nasopharyngitis (common cold).   This is the likely diagnosis based on patient's interview responses, including:    Symptom profile    Gradual onset of symptoms   Plan   Medications:   No medications prescribed.   Education:    Condition and causes    Prevention    Treatment and self-care    When to call provider   ----------   Electronically signed by PEDRO LUIS Gonzalez on 2023-10-12 at 07:38AM   ----------   Patient Interview Transcript:   Which of these symptoms are bothering you? Select all that apply.    Runny nose    Sore throat    Headache   Not selected:    Cough    Shortness of breath    Stuffed-up nose or sinuses    Itchy or watery eyes    Itchy nose or sneezing    Loss of smell or taste    Hoarse voice or loss of voice    Fever    Sweats    Chills    Muscle or body aches    Fatigue or tiredness    Nausea or vomiting    Diarrhea    I don't have any of these symptoms   When did your current symptoms start? Select one.    Less than 48 hours ago   Not selected:    3 to 5 days ago    6 to 9 days ago    10 to 14 days ago    2 to 3 weeks ago    3 to 4 weeks ago    More than a month ago   Do you know the exact date your symptoms started? If so, enter the date as MM/DD/YY. Select one.    Yes (specify): 10/09/23   Not selected:    No   Did your symptoms come on suddenly or gradually? Select one.    Gradually   Not selected:    Suddenly    I'm not sure   Since your current symptoms started, have you had a  "viral test for COVID-19? - This includes home self-tests as well as nose swab or saliva tests done at a doctor's office, lab, or testing site. - It does NOT include antibody tests, which use a blood sample to test for past infection. Select one.    No   Not selected:    Yes   Taking a home COVID test can help your provider give you the best care. If you have a COVID test kit, take the test now before continuing with this interview. Do you have a COVID test kit? Select one.    Yes, and I'll take a test now   Not selected:    Yes, but I prefer not to take a test now    No, I don't have a test kit   What is the result of the COVID-19 home test you just took? Select one.    Negative   Not selected:    Positive   Have you gotten the COVID-19 vaccine? Select one.    Yes   Not selected:    No   How many total doses of the COVID-19 vaccine have you gotten? This includes boosters as well as additional doses for those who are immunocompromised. Select one.    2 doses   Not selected:    1 dose    3 doses    4 doses    5 doses   1st dose    Pfizer   Not selected:    J&J/Louise    Moderna    Novavax   2nd dose    Pfizer   Not selected:    J&J/Louise    Moderna    Novavax   When did you get your most recent dose of the COVID-19 vaccine?    More than 14 days ago   Not selected:    Less than 48 hours (2 days) ago    48 to 72 hours (3 days) ago    3 to 5 days ago    5 to 7 days ago    7 to 14 days ago   In the last 14 days, have you traveled outside of your local community? This includes travel by car, RV, bus, train, or plane. Travel increases your chances of getting and spreading COVID-19. Select one.    No   Not selected:    Yes   In the last 14 days, have you had close contact with someone who has coronavirus (COVID-19)? \"Close contact\" means any of these: - Living in the same household as someone with COVID-19. - Caring for someone with COVID-19. - Being within 6 feet of someone with COVID-19 for a total of at least 15 " "minutes over a 24-hour period. For example, three 5-minute exposures for a total of 15 minutes. - Being in direct contact with respiratory droplets from someone with COVID-19 (being coughed on, kissing, sharing utensils). Select one.    Yes, a confirmed case   Not selected:    Yes, a suspected case    No, not that I know of   When did the close contact happen? Select one.    3 to 7 days ago   Not selected:    Within the last 2 days    More than 7 days ago   You mentioned having a headache. On a scale of 1 to 10, how severe is your headache pain? Select one.    Mild (1 to 3)   Not selected:    Moderate (4 to 6)    Severe (7 to 9)    Unbearable (10)    The worst headache of my life (10+)   Do you feel sinus pain or pressure in any of these areas?    In my forehead   Not selected:    Around my eyes    Behind my nose    In my cheeks    In my upper teeth or jaw    No   When did you first notice your sinus pain or pressure? Select one.    Less than 5 days ago   Not selected:    5 to 9 days ago    10 to 14 days ago    2 to 4 weeks ago    1 month ago or longer   Does coughing, sneezing, or leaning forward make your sinuses feel worse? Select one.    No   Not selected:    Yes   What color is your nasal drainage? Select one.    Yellow or yellowish   Not selected:    Clear    White    Green or greenish    My nose is stuffed but not draining or running   Is your nasal drainage thick or thin? Select one.    Thin   Not selected:    Thick   Is there any drainage (mucus) going down the back of your throat? This kind of drainage is also called \"postnasal drip.\" Select one.    No, not that I know of   Not selected:    Yes   Can you swallow liquids and solid foods? A sore throat may be painful when swallowing, but it shouldn't prevent you from swallowing. Select one.    Yes, but it's uncomfortable   Not selected:    Yes, with ease    Yes, but it's painful    It's hard to swallow anything because it feels like liquids and food get " stuck in my throat    No, I can't swallow anything, liquid or solid foods   Is your throat pain worse on one side than the other? Select one.    No, it's the same on both sides   Not selected:    Yes, it's worse on the right side    Yes, it's worse on the left side   Since your symptoms started, have you felt dizzy? Select one.    No   Not selected:    Yes, but I can continue with my regular daily activities    Yes, and it makes it hard to stand, walk, or do daily activities   Do you have chest pain? You might also feel it as discomfort, aching, tightness, or squeezing in the chest. Select one.    No   Not selected:    Yes   Have you urinated at least 3 times in the last 24 hours? Select one.    Yes   Not selected:    No   Changes in alertness or awareness may mean you need emergency care. Since your symptoms started, have you had any of these? Select all that apply.    None of the above   Not selected:    Confusion    Slurred speech    Not knowing where you are or what day it is    Difficulty staying conscious    Fainting or passing out   Do your symptoms include a whistling sound, or wheezing, when you breathe? Select one.    No   Not selected:    Yes    I'm not sure   Do you have any of these symptoms in your ear(s)? Select all that apply.    Pressure    Fullness   Not selected:    Pain    Crackling or popping    Plugged or blocked sensation    None of the above   Can you move your chin toward your chest?    Yes   Not selected:    No, my neck is too stiff   Are your tonsils larger than usual?    I've had my tonsils removed   Not selected:    Yes    No, not that I can tell   Are there red spots on the roof of your mouth or the back of your throat?    No, not that I can see   Not selected:    Yes   Are your glands/lymph nodes swollen, or does it hurt when you touch them?    Yes   Not selected:    No, not that I can tell   In the past week, has anyone around you (such as at school, work, or home) had a confirmed  diagnosis of the flu? A confirmed diagnosis means that a nose swab was done to verify a flu infection. Select all that apply.    No, not that I know of   Not selected:    I live with someone who has the flu    I've been within touching distance of someone who has the flu    I've walked by, or sat about 3 feet away from, someone who has the flu    I've been in the same building as someone who has the flu   Have you ever been diagnosed with asthma? Select one.    No   Not selected:    Yes   Have you ever been diagnosed with chronic obstructive pulmonary disease (COPD)? Select one.    No, not that I know of   Not selected:    Yes   In the past month, have you taken antibiotics for similar symptoms? Examples of antibiotics include amoxicillin, amoxicillin-clavulanate (Augmentin), penicillin, cefdinir (Omnicef), doxycycline, and clindamycin (Cleocin). Select one.    No   Not selected:    Yes (specify)   In the last year, how many times were you treated with antibiotics for a sinus infection? Select one.    1 to 3 times   Not selected:    None    4 or more times   Have you been diagnosed with a deviated septum or nasal polyps? The nose is divided into two nostrils by the septum. A crooked septum is called a deviated septum. Nasal polyps are growths inside the nose or sinuses. Select one.    No, not that I know of   Not selected:    Yes, but I had surgery to treat them    Yes, I have a deviated septum    Yes, I have nasal polyps    Yes, I have a deviated septum and nasal polyps   Do you have a sore inside your nose that won't heal? Select one.    No, not that I know of   Not selected:    Yes   Do you have allergies (pollen, dust mites, mold, animal dander)? Select one.    No, not that I know of   Not selected:    Yes   Have you had a flu shot this season? Select one.    Yes, less than 2 weeks ago   Not selected:    Yes, 2 to 4 weeks ago    Yes, 1 to 3 months ago    Yes, 3 to 6 months ago    Yes, more than 6 months ago     No   Are you pregnant? Select one.    No   Not selected:    Yes   When was your last menstrual period? If you don't currently have periods or no longer have periods, please briefly explain.    Last week   Within the last 2 weeks, have you: - Given birth - Had a miscarriage - Had a pregnancy loss - Had an  Being postpartum (live birth or loss) within the last 2 weeks increases your risk of flu complications. Select one.    No   Not selected:    Yes   Are you breastfeeding? Select one.    No   Not selected:    Yes   The flu and COVID-19 can be more serious for people in certain groups. The next few questions help us figure out if you or anyone you live with is at higher risk for complications from these infections. Do any of these statements apply to you? Select all that apply.    None of the above   Not selected:    I'm     I'm     I'm Black    I'm  or    Do you smoke tobacco? Select one.    No   Not selected:    Yes, every day    Yes, some days    No, I quit   Do you have any of these conditions? Select all that apply.    None of the above   Not selected:    Chronic lung disease, such as cystic fibrosis or interstitial fibrosis    Heart disease, such as congenital heart disease, congestive heart failure, or coronary artery disease    Disorder of the brain, spinal cord, or nerves and muscles, such as dementia, cerebral palsy, epilepsy, muscular dystrophy, or developmental delay    Metabolic disorder or mitochondrial disease    Cerebrovascular disease, such as stroke or another condition affecting the blood vessels or blood supply to the brain    Down syndrome    Mood disorder, including depression or schizophrenia spectrum disorders    Substance use disorder, such as alcohol, opioid, or cocaine use disorder    Tuberculosis   Do you live in a group care setting? Examples include: - Nursing home - Residential care - Psychiatric treatment facility - Group home -  Dormitory - Board and care home - Homeless shelter - Foster care setting Select one.    No   Not selected:    Yes   Are you a healthcare worker? Select one.    No   Not selected:    Yes   People with a very high body mass index (BMI) are at higher risk for developing complications from the flu and severe illness from COVID-19. To determine your BMI, we need to know your weight and height. Please enter your weight (in pounds).    Weight   Please enter your height.    Height   Do you have any of these conditions that can affect the immune system? Scroll to see all options. Select all that apply.    An autoimmune disorder not listed here (specify): Type one diabetes   Not selected:    History of bone marrow transplant    Chronic kidney disease    Chronic liver disease (including cirrhosis)    HIV/AIDS    Inflammatory bowel disease (Crohn's disease or ulcerative colitis)    Lupus    Moderate to severe plaque psoriasis    Multiple sclerosis    Rheumatoid arthritis    Sickle cell anemia    Alpha or beta thalassemia    History of solid organ transplant (kidney, liver, or heart)    History of spleen removal    A condition requiring treatment with long-term use of oral steroids (such as prednisone, prednisolone, or dexamethasone) (specify)    None of these   Do you take medications for your condition? This includes oral and injectable medications that are taken daily, weekly, or monthly. Select one.    Yes, regularly   Not selected:    Yes, for flare-ups only    No   Have you ever been diagnosed with cancer? Select one.    No   Not selected:    Yes, I have cancer now    Yes, but I'm in remission   Do any of these apply to you? Select all that apply.    I have diabetes   Not selected:    I've been hospitalized within the last 5 days    I'm in close contact with a child in     None of the above   The flu and COVID-19 can be more serious for people in certain groups. Do any of these apply to the people who live with you?  Select all that apply.    None of the above   Not selected:    Under age 5    Over age 65            Black     or     Pregnant    Has given birth, had a miscarriage, had a pregnancy loss, or had an  in the last 2 weeks   Does any member of your household have any of these medical conditions? Select all that apply.    None of the above   Not selected:    Asthma    Disorders of the brain, spinal cord, or nerves and muscles, such as dementia, cerebral palsy, epilepsy, muscular dystrophy, or developmental delay    Chronic lung disease, such as COPD or cystic fibrosis    Heart disease, such as congenital heart disease, congestive heart failure, or coronary artery disease    Cerebrovascular disease, such as stroke or another condition affecting the blood vessels or blood supply to the brain    Blood disorders, such as sickle cell disease    Diabetes    Metabolic disorders such as inherited metabolic disorders or mitochondrial disease    Kidney disorders    Liver disorders    Weakened immune system due to illness or medications such as chemotherapy or steroids    Children under the age of 19 who are on long-term aspirin therapy    Extreme obesity (BMI > 40)   Do you have any of these conditions? Scroll to see all options. Select all that apply.    Depression   Not selected:    Aspirin triad (also known as Samter's triad or ASA triad)    Asthma or hives from taking aspirin or other NSAIDs, such as ibuprofen or naproxen    Blockage or narrowing of the blood vessels of the heart    Blood clotting disorder    Blood dyscrasia, such anemia, leukemia, lymphoma, or myeloma    Bone marrow depression    Catecholamine-releasing paraganglioma    Congenital long QT syndrome    Difficulty urinating or completely emptying your bladder    Uncorrected electrolyte abnormalities    Fungal infection    Gastrointestinal (GI) bleeding    Gastrointestinal (GI) obstruction    G6PD deficiency     Recent heart attack    High blood pressure    Irregular heartbeat or heart rhythm    Mononucleosis (mono)    Myasthenia gravis    Parkinson's disease    Pheochromocytoma    Reye syndrome    Seizure disorder    Thyroid disease    Ulcerative colitis    None of the above   Have you ever had either of these conditions? Select all that apply.    No   Not selected:    Metoclopramide-associated dystonic reaction    Tardive dyskinesia   Just a few more questions about medications, and then you're finished. Have you used any non-prescription medications or nasal sprays for your current symptoms? Examples include saline sprays, decongestants, NyQuil, and Tylenol. Select one.    Yes   Not selected:    No   Which of these non-prescription medications have you tried? Scroll to see all options. Select all that apply.    Acetaminophen (Tylenol)   Not selected:    Budesonide (Rhinocort)    Cetirizine (Zyrtec)    Chlorpheniramine (Aller-chlor, Chlor-Trimeton)    Cromolyn (NasalCrom)    Dextromethorphan (Delsym, Robitussin, Vicks DayQuil Cough)    Diphenhydramine (Benadryl)    Fexofenadine (Allegra)    Fluticasone (Flonase)    Guaifenesin (Mucinex)    Guaifenesin/dextromethorphan (Delsym DM, Mucinex DM, Robitussin DM)    Ibuprofen (Advil, Motrin, Midol)    Ketotifen (Alaway, Zaditor)    Loratadine (Alavert, Claritin)    Naphazoline-pheniramine (Naphcon-A, Opcon-A, Visine-A)    Omeprazole (Prilosec)    Oxymetazoline (Afrin)    Phenylephrine (Sudafed PE)    Triamcinolone (Nasacort)    None of the above   Have you taken any monoamine oxidase inhibitor (MAOI) medications in the last 14 days? Examples include rasagiline (Azilect), selegiline (Eldepryl, Zelapar), isocarboxazid (Marplan), phenelzine (Nardil), and tranylcypromine (Parnate). Select one.    No, not that I know of   Not selected:    Yes   Do you take Kynmobi or Apokyn (apomorphine)? Select one.    No   Not selected:    Yes   Are you still taking these medications listed in  "your medical record? If you're not taking any of these, click Next. Select all that apply.    DULoxetine 60 MG capsule    Dexcom G6 Sensor    busPIRone 5 MG tablet    Dexcom G6 Transmitter misc    NovoLOG 100 UNIT/ML injection   Are you taking any other medications, vitamins, or supplements? Select one.    Yes   Not selected:    No   Have you ever had an allergic or bad reaction to any medication? Select one.    Yes   Not selected:    No   Have you had an allergic or bad reaction to any of these medications? Select all that apply.    No, not that I know of   Not selected:    Baloxavir (Xofluza)    Benzonatate (Tessalon Perles)    Fluconazole, itraconazole, or terconazole (brands include Diflucan, Sporanox, Terazol)    Oseltamivir (Tamiflu) or zanamivir (Relenza)    Paxlovid, nirmatrelvir, or ritonavir (Norvir)   Have you had an allergic or bad reaction to any of these antibiotic medications? Select all that apply.    No, not that I know of   Not selected:    Penicillin or any \"-cillin\" antibiotic, such as amoxicillin, ampicillin, dicloxacillin, nafcillin, or piperacillin (Brands include Augmentin, Unasyn, and Zosyn)    Tetracycline or any \"-cycline\" antibiotic, such as doxycycline, demeclocycline, minocycline (Brands include Declomycin, Doryx, Dynacin, Oracea, Monodox, Panmycin, and Vibramycin)    Ciprofloxacin or any \"-floxacin\" antibiotic, such as gemifloxacin, levofloxacin, moxifloxacin, or ofloxacin (Brands include Factive, Cipro, Floxin, and Levaquin)    Cephalexin or any \"cef-\" antibiotic, such as cefazolin, cefdinir, cefuroxime, ceftriaxone, ceftazidime, or cefepime (Brands include Ancef, Ceftin, Fortaz, Keflex, Maxipime, Rocephin, and Simplicef)    Azithromycin or any \"-thromycin\" antibiotic, such as erythromycin or clarithromycin (Brands include Biaxin, Erythrocin, Z-colleen, and Zithromax)    Clindamycin or lincomycin (Brands include Cleocin and Lincocin)   Have you had an allergic or bad reaction to any of " these medications? Select all that apply.    No, not that I know of   Not selected:    Albuterol or a similar medication    Atropine    Corticosteroid (steroid) medication, including topical steroids, inhaled steroids, nasal steroids, or oral steroids (budesonide, ciclesonide, dexamethasone, flunisolide, fluticasone, methylprednisolone, triamcinolone, prednisone (or brand names Alvesco, Deltasone, Flovent, Medrol, Nasacort, Rhinocort, or Veramyst)    Metoclopramide (Reglan)    Ondansetron (Zuplenz, Zofran ODT, Zofran)    Prochlorperazine (Compazine)   Have you had an allergic or bad reaction to any of these eye drops, nasal sprays, or inhalers? Scroll to see all options. Select all that apply.    No, not that I know of   Not selected:    Azelastine (Astelin, Astepro, Optivar)    Cromolyn (Crolom, NasalCrom)    Ipratropium (Atrovent)    Ketotifen (Alaway, Zaditor)    Pheniramine/naphazoline (Naphcon-A, Opcon-A, Visine-A)    Olopatadine (Pataday, Patanol, Pazeo)   Have you had an allergic or bad reaction to any of these non-prescription medications? Scroll to see all options. Select all that apply.    No, not that I know of   Not selected:    Acetaminophen (Tylenol)    Aspirin    Cetirizine (Zyrtec)    Dextromethorphan (Delsym, Robitussin, Vicks DayQuil Cough)    Diphenhydramine (Benadryl)    Fexofenadine (Allegra)    Guaifenesin (Mucinex)    Dextromethorphan (Delsym)    Ibuprofen (Advil, Motrin, Midol)    Loratadine (Alavert, Claritin)    Oxymetazoline (Afrin)    Phenylephrine (Sudafed PE)    Pseudoephedrine (Sudafed)   Are you allergic to milk or to the proteins found in milk (for example, whey or casein)? A milk allergy is different from lactose intolerance. Select one.    No, not that I know of   Not selected:    Yes   Have you ever had jaundice or liver problems as a result of taking amoxicillin-clavulanate (Augmentin)? Jaundice is a condition in which the skin and the whites of the eyes turn yellow. Select all  that apply.    No, not that I know of   Not selected:    Yes, jaundice    Yes, liver problems   Have you ever had jaundice or liver problems as a result of taking azithromycin (Zithromax, Zmax)? Jaundice is a condition in which the skin and the whites of the eyes turn yellow. Select all that apply.    No, not that I know of   Not selected:    Yes, jaundice    Yes, liver problems   Do you need a doctor's note? A doctor's note confirms that you received care today and states when you can return to school or work. It does not contain information about your diagnosis or treatment plan. Your provider will make the final decision on whether to give you a doctor's note and for how long. Doctor's notes CANNOT be backdated. We can't provide medical leave paperwork through this type of visit. If more paperwork is needed to request time off, contact your primary care provider. Select one.    No   Not selected:    Today only (1 day)    Today and tomorrow (2 days)    3 days    5 days    7 days    10 days    14 days   Is there anything you'd like to add about your symptoms? Please limit your comments to the symptoms asked about in this interview. If you include comments about other concerns, your provider may recommend that you be seen in person.    I get this dull ache in my ears when I either am getting an ear infection or sinus infection. I had the flu shot on Friday, October 6th. I am allergic to sulfa drugs and cannot take steroids because of my diabetes.   ----------   Medical history   The following information was received from the EMR on October 12, 2023.   Allergies:    SULFA ANTIBIOTICS   - Allergy Type: Medication   - Reaction: Hives, Rash   - Severity: Low   - Clinical Status: Active   - Verification Status: Confirmed    MELOXICAM   - Allergy Type: Medication   - Reaction: Nausea Only   - Severity: None   - Clinical Status: Active   - Verification Status: Confirmed   Medications:    OMNIPOD 5 G6 INTRO (GEN 5) KIT    - Route:   - Start Date: October 16, 2022   - End Date: None   - Status: Active    DULoxetine (CYMBALTA) DR capsule   - Route:   - Start Date: August 16, 2023   - End Date: None   - Status: Active    DEXCOM G6 SENSOR MISC   - Route:   - Start Date: November 15, 2021   - End Date: None   - Status: Active    busPIRone (BUSPAR) tablet   - Route:   - Start Date: September 05, 2023   - End Date: None   - Status: Active    DEXCOM G6 TRANSMITTER MISC   - Route:   - Start Date: April 25, 2022   - End Date: None   - Status: Active    NOVOLOG 100 UNIT/ML SC SOLN   - Route:   - Start Date: April 25, 2022   - End Date: None   - Status: Active    hydrOXYzine (ATARAX) tablet   - Route: Oral   - Start Date: August 07, 2023   - End Date: None   - Status: Active   Problem list:    Type 1 diabetes   - Category: Problem List Item   - Health Status:   - Start Date: November 15, 2021   - End Date: None   - Status: Active    Right knee meniscal tear   - Category: Problem List Item   - Health Status:   - Start Date: November 15, 2021   - End Date: November 15, 2021   - Status: Resolved    Depression with anxiety   - Category: Problem List Item   - Health Status:   - Start Date: November 15, 2021   - End Date: None   - Status: Active     Student

## 2023-10-20 DIAGNOSIS — F41.8 DEPRESSION WITH ANXIETY: ICD-10-CM

## 2023-10-20 RX ORDER — DULOXETIN HYDROCHLORIDE 60 MG/1
CAPSULE, DELAYED RELEASE ORAL
Qty: 90 CAPSULE | Refills: 0 | Status: SHIPPED | OUTPATIENT
Start: 2023-10-20

## 2023-12-31 ENCOUNTER — APPOINTMENT (OUTPATIENT)
Dept: GENERAL RADIOLOGY | Facility: HOSPITAL | Age: 33
End: 2023-12-31
Payer: COMMERCIAL

## 2023-12-31 ENCOUNTER — HOSPITAL ENCOUNTER (EMERGENCY)
Facility: HOSPITAL | Age: 33
Discharge: HOME | End: 2023-12-31
Attending: STUDENT IN AN ORGANIZED HEALTH CARE EDUCATION/TRAINING PROGRAM | Admitting: STUDENT IN AN ORGANIZED HEALTH CARE EDUCATION/TRAINING PROGRAM
Payer: COMMERCIAL

## 2023-12-31 VITALS
HEART RATE: 97 BPM | OXYGEN SATURATION: 98 % | RESPIRATION RATE: 16 BRPM | SYSTOLIC BLOOD PRESSURE: 127 MMHG | TEMPERATURE: 97.7 F | DIASTOLIC BLOOD PRESSURE: 84 MMHG

## 2023-12-31 DIAGNOSIS — M54.50 ACUTE BILATERAL LOW BACK PAIN WITHOUT SCIATICA: Primary | ICD-10-CM

## 2023-12-31 PROCEDURE — 96372 THER/PROPH/DIAG INJ SC/IM: CPT

## 2023-12-31 PROCEDURE — 72110 X-RAY EXAM L-2 SPINE 4/>VWS: CPT

## 2023-12-31 PROCEDURE — 99283 EMERGENCY DEPT VISIT LOW MDM: CPT

## 2023-12-31 PROCEDURE — 25010000002 KETOROLAC TROMETHAMINE PER 15 MG: Performed by: STUDENT IN AN ORGANIZED HEALTH CARE EDUCATION/TRAINING PROGRAM

## 2023-12-31 RX ORDER — LIDOCAINE 4 G/G
1 PATCH TOPICAL ONCE
Qty: 1 PATCH | Refills: 0 | Status: DISCONTINUED | OUTPATIENT
Start: 2023-12-31 | End: 2023-12-31 | Stop reason: HOSPADM

## 2023-12-31 RX ORDER — CYCLOBENZAPRINE HCL 10 MG
10 TABLET ORAL 3 TIMES DAILY PRN
Qty: 30 TABLET | Refills: 0 | Status: SHIPPED | OUTPATIENT
Start: 2023-12-31 | End: 2024-01-10

## 2023-12-31 RX ORDER — KETOROLAC TROMETHAMINE 30 MG/ML
30 INJECTION, SOLUTION INTRAMUSCULAR; INTRAVENOUS ONCE
Status: COMPLETED | OUTPATIENT
Start: 2023-12-31 | End: 2023-12-31

## 2023-12-31 RX ORDER — CYCLOBENZAPRINE HCL 10 MG
10 TABLET ORAL ONCE
Status: COMPLETED | OUTPATIENT
Start: 2023-12-31 | End: 2023-12-31

## 2023-12-31 RX ADMIN — KETOROLAC TROMETHAMINE 30 MG: 30 INJECTION, SOLUTION INTRAMUSCULAR; INTRAVENOUS at 13:59

## 2023-12-31 RX ADMIN — CYCLOBENZAPRINE 10 MG: 10 TABLET, FILM COATED ORAL at 14:00

## 2023-12-31 RX ADMIN — LIDOCAINE 1 PATCH: 4 PATCH TOPICAL at 14:00

## 2023-12-31 NOTE — ED PROVIDER NOTES
Subjective   History of Present Illness  Pt is a 33 y.o. female with PMH as listed who presents for   Chief Complaint   Patient presents with    Back Pain     Lower back pain after lifting heavy box on Wednesday, had improved yesterday but worsened after walking yesterday       Patient is a 33-year-old female with past medical history as listed below presents for low back pain.  States that she was lifting a heavy box on Wednesday and had acute pain in her lower back.  She used rest ice and alternated with heat with improvement.  She had a lot more activity yesterday however and today woke up with severe lower back pain.  No red flag symptoms, no fever, weakness, numbness, bowel or bladder incontinence.  No other new complaints.    Review of Systems    Past Medical History:   Diagnosis Date    Depression with anxiety 11/15/2021    Diabetes mellitus     Right knee meniscal tear 02/13/2019    Formatting of this note might be different from the original. Added automatically from request for surgery [id number]       Allergies   Allergen Reactions    Meloxicam Nausea Only    Sulfa Antibiotics Hives and Rash     Reaction as a baby       Past Surgical History:   Procedure Laterality Date    ADENOIDECTOMY      KNEE MENISCAL REPAIR Right     TONSILLECTOMY         No family history on file.    Social History     Socioeconomic History    Marital status:    Tobacco Use    Smoking status: Never    Smokeless tobacco: Never   Vaping Use    Vaping Use: Never used   Substance and Sexual Activity    Alcohol use: Not Currently    Drug use: Never    Sexual activity: Defer           Objective   Physical Exam  Constitutional:       Appearance: Normal appearance.   HENT:      Head: Normocephalic and atraumatic.      Mouth/Throat:      Mouth: Mucous membranes are moist.      Pharynx: Oropharynx is clear.   Eyes:      Conjunctiva/sclera: Conjunctivae normal.   Cardiovascular:      Rate and Rhythm: Normal rate.   Pulmonary:      Effort:  Pulmonary effort is normal.   Abdominal:      General: Abdomen is flat.   Musculoskeletal:      Cervical back: Neck supple.      Comments: Diffuse lower back tenderness, no point tenderness over L-spine.   Skin:     General: Skin is warm and dry.   Neurological:      Mental Status: She is alert.   Psychiatric:         Mood and Affect: Mood normal.         Procedures           ED Course                                             Medical Decision Making  My differential diagnosis for back pain includes but is not limited to:  Musculoskeletal strain, contusion, retroperitoneal hematoma, disc protrusion, vertebral fracture, transverse process fracture, rib fracture, facet syndrome, sacroiliac joint strain, sciatica, renal injury, splenic injury, pancreatic injury, osteoarthritis, lumbar spondylosis, spinal stenosis, ankylosing spondylitis, sacroiliac joint inflammation, pancreatitis, perforated peptic ulcer, diverticulitis, endometriosis, chronic PID, epidural abscess, osteomyelitis, retroperitoneal abscess, pyelonephritis, pneumonia, subphrenic abscess, tuberculosis, neurofibroma, meningioma, multiple myeloma, lymphoma, metastatic cancer, primary cancer, AAA, aortic dissection, spinal ischemia, referred pain, ureterolithiasis      Problems Addressed:  Acute bilateral low back pain without sciatica: complicated acute illness or injury    Amount and/or Complexity of Data Reviewed  Radiology: ordered.     Details: X-ray negative for acute fracture dislocation based on my interpretation    Risk  OTC drugs.  Prescription drug management.      Patient presents for low back pain, exam is significant for diffuse tenderness, no point tenderness.  Due to acute injury however will obtain L-spine plain films to assess for any acute fracture.  X-rays negative for any acute findings.  Patient given Flexeril, lidocaine patch, Toradol with moderate improvement.  Patient now able to stand unassisted.  Discussed with patient plan for  discharge with PCP follow-up and prescription for cyclobenzaprine.  Patient understands and agrees with plan of care.  All questions answered.    Final diagnoses:   Acute bilateral low back pain without sciatica       ED Disposition  ED Disposition       ED Disposition   Discharge    Condition   Stable    Comment   --               Wilma Barragan, APRN  [address]  [address]  [place] [address] [phone]    Schedule an appointment as soon as possible for a visit in 3 days  For re-evaluation         Medication List        New Prescriptions      cyclobenzaprine 10 MG tablet  Commonly known as: FLEXERIL  Take 1 tablet by mouth 3 (Three) Times a Day As Needed for Muscle Spasms for up to 10 days.               Where to Get Your Medications        These medications were sent to [facility] [id number] - [place] - [address] - [phone] [facility] [phone] FX  [address] [place] [address]      Phone: [phone]   cyclobenzaprine 10 MG tablet            Rian Reid MD  12/31/23 [id number]

## 2024-02-09 DIAGNOSIS — F41.8 DEPRESSION WITH ANXIETY: ICD-10-CM

## 2024-02-09 RX ORDER — DULOXETIN HYDROCHLORIDE 60 MG/1
CAPSULE, DELAYED RELEASE ORAL
Qty: 90 CAPSULE | Refills: 0 | Status: SHIPPED | OUTPATIENT
Start: 2024-02-09

## 2024-02-16 ENCOUNTER — OFFICE VISIT (OUTPATIENT)
Dept: INTERNAL MEDICINE | Facility: CLINIC | Age: 34
End: 2024-02-16
Payer: COMMERCIAL

## 2024-02-16 VITALS
HEART RATE: 90 BPM | SYSTOLIC BLOOD PRESSURE: 124 MMHG | HEIGHT: 68 IN | DIASTOLIC BLOOD PRESSURE: 84 MMHG | OXYGEN SATURATION: 98 % | WEIGHT: 202.4 LBS | TEMPERATURE: 97.8 F | BODY MASS INDEX: 30.68 KG/M2

## 2024-02-16 DIAGNOSIS — F41.1 GAD (GENERALIZED ANXIETY DISORDER): ICD-10-CM

## 2024-02-16 DIAGNOSIS — F33.1 MODERATE EPISODE OF RECURRENT MAJOR DEPRESSIVE DISORDER: ICD-10-CM

## 2024-02-16 PROCEDURE — 99214 OFFICE O/P EST MOD 30 MIN: CPT | Performed by: NURSE PRACTITIONER

## 2024-03-05 ENCOUNTER — TELEMEDICINE (OUTPATIENT)
Dept: INTERNAL MEDICINE | Facility: CLINIC | Age: 34
End: 2024-03-05
Payer: COMMERCIAL

## 2024-03-05 VITALS — TEMPERATURE: 98.5 F | WEIGHT: 202 LBS | BODY MASS INDEX: 30.62 KG/M2 | HEIGHT: 68 IN

## 2024-03-05 DIAGNOSIS — F41.1 GAD (GENERALIZED ANXIETY DISORDER): ICD-10-CM

## 2024-03-05 DIAGNOSIS — F33.1 MODERATE EPISODE OF RECURRENT MAJOR DEPRESSIVE DISORDER: Primary | ICD-10-CM

## 2024-03-05 PROCEDURE — 99214 OFFICE O/P EST MOD 30 MIN: CPT | Performed by: NURSE PRACTITIONER

## 2024-03-05 RX ORDER — DESVENLAFAXINE SUCCINATE 50 MG/1
50 TABLET, EXTENDED RELEASE ORAL DAILY
Qty: 30 TABLET | Refills: 1 | Status: SHIPPED | OUTPATIENT
Start: 2024-03-05

## 2024-03-05 NOTE — PROGRESS NOTES
Candice Mckeon is a 33 y.o. female presenting today for   Chief Complaint   Patient presents with    Results     [facility] results     This visit was conducted via [facility].    At the time of this visit, I am located in my office and the patient in his/her home.      Subjective    History of Present Illness     Presents for f/u 2/2 MDD and GENA to discuss [facility] results.    The following portions of the patient's history were reviewed and updated as appropriate: allergies, current medications, problem list, past medical history, past surgical history, family history, and social history.          Objective      Physical Exam  Constitutional:       General: She is not in acute distress.     Appearance: She is well-developed.   Pulmonary:      Effort: Pulmonary effort is normal.   Neurological:      Mental Status: She is alert.   Psychiatric:         Attention and Perception: She is attentive.         Speech: Speech normal.           Assessment and Plan    Diagnoses and all orders for this visit:    1. Moderate episode of recurrent major depressive disorder (Primary)  -     desvenlafaxine (PRISTIQ) 50 MG 24 hr tablet; Take 1 tablet by mouth Daily.  Dispense: 30 tablet; Refill: 1    2. GENA (generalized anxiety disorder)  -     desvenlafaxine (PRISTIQ) 50 MG 24 hr tablet; Take 1 tablet by mouth Daily.  Dispense: 30 tablet; Refill: 1      Discontinued Medications         Reason for Discontinue     DULoxetine (CYMBALTA) 60 MG capsule    Not Efficacious              Medications, including side effects, were discussed with the patient. Patient verbalized understanding.  The plan of care was discussed. All questions were answered. Patient verbalized understanding.        Return in about 4 weeks (around 4/2/2024).

## 2024-03-14 ENCOUNTER — APPOINTMENT (OUTPATIENT)
Dept: GENERAL RADIOLOGY | Facility: HOSPITAL | Age: 34
End: 2024-03-14
Payer: COMMERCIAL

## 2024-03-14 ENCOUNTER — HOSPITAL ENCOUNTER (EMERGENCY)
Facility: HOSPITAL | Age: 34
Discharge: HOME | End: 2024-03-14
Attending: EMERGENCY MEDICINE
Payer: COMMERCIAL

## 2024-03-14 VITALS
TEMPERATURE: 99.6 F | WEIGHT: 194 LBS | DIASTOLIC BLOOD PRESSURE: 80 MMHG | HEIGHT: 68 IN | RESPIRATION RATE: 16 BRPM | BODY MASS INDEX: 29.4 KG/M2 | OXYGEN SATURATION: 97 % | HEART RATE: 91 BPM | SYSTOLIC BLOOD PRESSURE: 118 MMHG

## 2024-03-14 DIAGNOSIS — K52.9 ACUTE GASTROENTERITIS: Primary | ICD-10-CM

## 2024-03-14 LAB
ACETONE BLD QL: NEGATIVE
ALBUMIN SERPL-MCNC: 3.9 G/DL (ref 3.5–5.2)
ALBUMIN/GLOB SERPL: 1.1 G/DL
ALP SERPL-CCNC: 73 U/L (ref 39–117)
ALT SERPL W P-5'-P-CCNC: 19 U/L (ref 1–33)
ANION GAP SERPL CALCULATED.3IONS-SCNC: 10.5 MMOL/L (ref 5–15)
AST SERPL-CCNC: 21 U/L (ref 1–32)
B-HCG UR QL: NEGATIVE
BACTERIA UR QL AUTO: ABNORMAL /HPF
BASOPHILS # BLD AUTO: 0.01 10*3/MM3 (ref 0–0.2)
BASOPHILS NFR BLD AUTO: 0.3 % (ref 0–1.5)
BILIRUB SERPL-MCNC: 0.2 MG/DL (ref 0–1.2)
BILIRUB UR QL STRIP: NEGATIVE
BUN SERPL-MCNC: 11 MG/DL (ref 6–20)
BUN/CREAT SERPL: 11.5 (ref 7–25)
CALCIUM SPEC-SCNC: 8.5 MG/DL (ref 8.6–10.5)
CHLORIDE SERPL-SCNC: 99 MMOL/L (ref 98–107)
CLARITY UR: CLEAR
CO2 SERPL-SCNC: 23.5 MMOL/L (ref 22–29)
COLOR UR: YELLOW
CREAT SERPL-MCNC: 0.96 MG/DL (ref 0.57–1)
D-LACTATE SERPL-SCNC: 1 MMOL/L (ref 0.5–2)
DEPRECATED RDW RBC AUTO: 38.3 FL (ref 37–54)
EGFRCR SERPLBLD CKD-EPI 2021: 79.8 ML/MIN/1.73
EOSINOPHIL # BLD AUTO: 0.12 10*3/MM3 (ref 0–0.4)
EOSINOPHIL NFR BLD AUTO: 3.8 % (ref 0.3–6.2)
ERYTHROCYTE [DISTWIDTH] IN BLOOD BY AUTOMATED COUNT: 12.3 % (ref 12.3–15.4)
FLUAV SUBTYP SPEC NAA+PROBE: NOT DETECTED
FLUBV RNA ISLT QL NAA+PROBE: NOT DETECTED
GLOBULIN UR ELPH-MCNC: 3.6 GM/DL
GLUCOSE SERPL-MCNC: 169 MG/DL (ref 65–99)
GLUCOSE UR STRIP-MCNC: NEGATIVE MG/DL
GRAN CASTS URNS QL MICRO: ABNORMAL /LPF
HCT VFR BLD AUTO: 39.9 % (ref 34–46.6)
HGB BLD-MCNC: 13.6 G/DL (ref 12–15.9)
HGB UR QL STRIP.AUTO: NEGATIVE
HOLD SPECIMEN: NORMAL
HOLD SPECIMEN: NORMAL
HYALINE CASTS UR QL AUTO: ABNORMAL /LPF
IMM GRANULOCYTES # BLD AUTO: 0.01 10*3/MM3 (ref 0–0.05)
IMM GRANULOCYTES NFR BLD AUTO: 0.3 % (ref 0–0.5)
KETONES UR QL STRIP: ABNORMAL
LEUKOCYTE ESTERASE UR QL STRIP.AUTO: NEGATIVE
LYMPHOCYTES # BLD AUTO: 0.36 10*3/MM3 (ref 0.7–3.1)
LYMPHOCYTES NFR BLD AUTO: 11.3 % (ref 19.6–45.3)
MCH RBC QN AUTO: 29.1 PG (ref 26.6–33)
MCHC RBC AUTO-ENTMCNC: 34.1 G/DL (ref 31.5–35.7)
MCV RBC AUTO: 85.4 FL (ref 79–97)
MONOCYTES # BLD AUTO: 0.39 10*3/MM3 (ref 0.1–0.9)
MONOCYTES NFR BLD AUTO: 12.2 % (ref 5–12)
MUCOUS THREADS URNS QL MICRO: ABNORMAL /HPF
NEUTROPHILS NFR BLD AUTO: 2.31 10*3/MM3 (ref 1.7–7)
NEUTROPHILS NFR BLD AUTO: 72.1 % (ref 42.7–76)
NITRITE UR QL STRIP: NEGATIVE
NRBC BLD AUTO-RTO: 0 /100 WBC (ref 0–0.2)
PH UR STRIP.AUTO: 5.5 [PH] (ref 4.5–8)
PLATELET # BLD AUTO: 148 10*3/MM3 (ref 140–450)
PMV BLD AUTO: 10.7 FL (ref 6–12)
POTASSIUM SERPL-SCNC: 3.5 MMOL/L (ref 3.5–5.2)
PROCALCITONIN SERPL-MCNC: 0.05 NG/ML (ref 0–0.25)
PROT SERPL-MCNC: 7.5 G/DL (ref 6–8.5)
PROT UR QL STRIP: ABNORMAL
RBC # BLD AUTO: 4.67 10*6/MM3 (ref 3.77–5.28)
RBC # UR STRIP: ABNORMAL /HPF
REF LAB TEST METHOD: ABNORMAL
SARS-COV-2 RNA RESP QL NAA+PROBE: NOT DETECTED
SODIUM SERPL-SCNC: 133 MMOL/L (ref 136–145)
SP GR UR STRIP: 1.03 (ref 1–1.03)
SQUAMOUS #/AREA URNS HPF: ABNORMAL /HPF
TRANS CELLS #/AREA URNS HPF: ABNORMAL /HPF
UROBILINOGEN UR QL STRIP: ABNORMAL
WBC # UR STRIP: ABNORMAL /HPF
WBC NRBC COR # BLD AUTO: 3.2 10*3/MM3 (ref 3.4–10.8)
WHOLE BLOOD HOLD COAG: NORMAL
WHOLE BLOOD HOLD SPECIMEN: NORMAL

## 2024-03-14 PROCEDURE — 87040 BLOOD CULTURE FOR BACTERIA: CPT

## 2024-03-14 PROCEDURE — 85025 COMPLETE CBC W/AUTO DIFF WBC: CPT

## 2024-03-14 PROCEDURE — 82009 KETONE BODYS QUAL: CPT | Performed by: EMERGENCY MEDICINE

## 2024-03-14 PROCEDURE — 81001 URINALYSIS AUTO W/SCOPE: CPT | Performed by: EMERGENCY MEDICINE

## 2024-03-14 PROCEDURE — 25010000002 KETOROLAC TROMETHAMINE PER 15 MG: Performed by: EMERGENCY MEDICINE

## 2024-03-14 PROCEDURE — 81025 URINE PREGNANCY TEST: CPT | Performed by: EMERGENCY MEDICINE

## 2024-03-14 PROCEDURE — 71045 X-RAY EXAM CHEST 1 VIEW: CPT

## 2024-03-14 PROCEDURE — 96374 THER/PROPH/DIAG INJ IV PUSH: CPT

## 2024-03-14 PROCEDURE — 99283 EMERGENCY DEPT VISIT LOW MDM: CPT

## 2024-03-14 PROCEDURE — 80053 COMPREHEN METABOLIC PANEL: CPT

## 2024-03-14 PROCEDURE — 25010000002 ONDANSETRON PER 1 MG

## 2024-03-14 PROCEDURE — 84145 PROCALCITONIN (PCT): CPT | Performed by: EMERGENCY MEDICINE

## 2024-03-14 PROCEDURE — 83605 ASSAY OF LACTIC ACID: CPT

## 2024-03-14 PROCEDURE — 96375 TX/PRO/DX INJ NEW DRUG ADDON: CPT

## 2024-03-14 PROCEDURE — 25810000003 SODIUM CHLORIDE 0.9 % SOLUTION

## 2024-03-14 PROCEDURE — 87636 SARSCOV2 & INF A&B AMP PRB: CPT

## 2024-03-14 RX ORDER — SODIUM CHLORIDE 0.9 % (FLUSH) 0.9 %
10 SYRINGE (ML) INJECTION AS NEEDED
Status: DISCONTINUED | OUTPATIENT
Start: 2024-03-14 | End: 2024-03-15 | Stop reason: HOSPADM

## 2024-03-14 RX ORDER — KETOROLAC TROMETHAMINE 30 MG/ML
30 INJECTION, SOLUTION INTRAMUSCULAR; INTRAVENOUS ONCE
Status: COMPLETED | OUTPATIENT
Start: 2024-03-14 | End: 2024-03-14

## 2024-03-14 RX ORDER — ONDANSETRON HYDROCHLORIDE 8 MG/1
8 TABLET, FILM COATED ORAL EVERY 8 HOURS PRN
Qty: 10 TABLET | Refills: 0 | Status: SHIPPED | OUTPATIENT
Start: 2024-03-14

## 2024-03-14 RX ORDER — ONDANSETRON 2 MG/ML
4 INJECTION INTRAMUSCULAR; INTRAVENOUS ONCE
Status: COMPLETED | OUTPATIENT
Start: 2024-03-14 | End: 2024-03-14

## 2024-03-14 RX ORDER — OSELTAMIVIR PHOSPHATE 75 MG/1
75 CAPSULE ORAL 2 TIMES DAILY
COMMUNITY
Start: 2024-03-13 | End: 2024-03-18

## 2024-03-14 RX ADMIN — ONDANSETRON 4 MG: 2 INJECTION INTRAMUSCULAR; INTRAVENOUS at 21:20

## 2024-03-14 RX ADMIN — SODIUM CHLORIDE 1000 ML: 9 INJECTION, SOLUTION INTRAVENOUS at 21:16

## 2024-03-14 RX ADMIN — KETOROLAC TROMETHAMINE 30 MG: 30 INJECTION, SOLUTION INTRAMUSCULAR; INTRAVENOUS at 22:15

## 2024-03-14 NOTE — Clinical Note
NEETU MENON  [facility] EMERGENCY DEPARTMENT  [address] [place] [address]  Phone: [phone]    Candice Mckeon was seen and treated in our emergency department on 3/14/2024.  She may return to work on 03/18/2024.         Thank you for choosing [facility].    Rio Hendrix MD

## 2024-03-15 NOTE — ED NOTES
Blood glucose down to 180 per patient dexcom, she is tolerating oral fluids, her pain is improved, she has no nausea.

## 2024-03-15 NOTE — DISCHARGE INSTRUCTIONS
Drink clear liquids for 24 to 48 hours, then advance diet as tolerated.  Take the Zofran as needed as directed for nausea and vomiting.  Follow-up with Wilma Barragan on Monday.  Return to the emergency department there is vomiting not controlled with Zofran, vomiting blood, bloody diarrhea, worse in any way at all.

## 2024-03-15 NOTE — ED PROVIDER NOTES
Subjective   History of Present Illness    Chief complaint: Fever and cough    Location: Home    Quality/Severity: Tmax 103.6    Timing/Onset/Duration: Patient started feeling bad Tuesday    Modifying Factors: Nothing seems to make it better    Associated Symptoms: Patient complains of a mild headache.  Tmax been 103.6.  Patient said chills.  Patient had a cough productive of mild yellowish sputum.  No sore throat or earache.  No chest pain.  Patient has had some shortness of breath.  No abdominal pain.  Patient's had some diarrhea has been nonbloody.  Patient's had nausea and vomiting and it has been nonbloody and nonbilious.  Patient has not been on antibiotics recently.  She has not traveled out of the country.  He has no history of C. difficile.  She has not ingested any untreated water.    Narrative: This 33-year-old presents with fever and cough, chills and bodyaches.  Her symptoms began on Tuesday.    Review of Systems   Constitutional:  Positive for chills and fever.   HENT:  Positive for congestion. Negative for ear pain and sore throat.    Respiratory:  Positive for shortness of breath.    Cardiovascular:  Negative for chest pain.   Gastrointestinal:  Positive for diarrhea, nausea and vomiting. Negative for abdominal pain.   Genitourinary:  Negative for difficulty urinating.   Musculoskeletal:  Negative for back pain.   Neurological:  Positive for weakness (Generalized) and headaches. Negative for numbness.         Past Medical History:   Diagnosis Date    Depression with anxiety 11/15/2021    Diabetes mellitus     Right knee meniscal tear 02/13/2019    Formatting of this note might be different from the original. Added automatically from request for surgery [id number]       Allergies   Allergen Reactions    Meloxicam Nausea Only    Sulfa Antibiotics Hives and Rash     Reaction as a baby       Past Surgical History:   Procedure Laterality Date    ADENOIDECTOMY      KNEE MENISCAL REPAIR Right     TONSILLECTOMY          Family History   Problem Relation Age of Onset    Anxiety disorder Mother     Cancer Father         Kidney cancer    Diabetes Father         His is type 2       Social History     Socioeconomic History    Marital status:    Tobacco Use    Smoking status: Never    Smokeless tobacco: Never   Vaping Use    Vaping status: Never Used   Substance and Sexual Activity    Alcohol use: Not Currently     Alcohol/week: 1.0 standard drink of alcohol     Types: 1 Drinks containing 0.5 oz of alcohol per week     Comment: Very rare. Maybe a drink once a month or two months.    Drug use: Never    Sexual activity: Yes     Partners: Male     Birth control/protection: Other           Objective   Physical Exam  Vitals (The temperature is 102.2 °F, pulse 1.3, respirations 17, room air pulse ox 97%.) and nursing note reviewed.   Constitutional:       Appearance: Normal appearance.   HENT:      Head: Normocephalic and atraumatic.      Right Ear: Tympanic membrane normal.      Left Ear: Tympanic membrane normal.      Nose: Nose normal.      Mouth/Throat:      Mouth: Mucous membranes are moist.   Cardiovascular:      Rate and Rhythm: Tachycardia present.      Pulses: Normal pulses.      Heart sounds: Normal heart sounds. No murmur heard.     No friction rub. No gallop.   Pulmonary:      Effort: Pulmonary effort is normal.      Breath sounds: Normal breath sounds.   Abdominal:      General: Abdomen is flat. Bowel sounds are normal. There is no distension.      Palpations: Abdomen is soft. There is no mass.      Tenderness: There is no abdominal tenderness. There is no guarding or rebound.      Hernia: No hernia is present.   Musculoskeletal:         General: No swelling or tenderness. Normal range of motion.      Cervical back: Normal range of motion and neck supple. No rigidity.   Skin:     General: Skin is warm and dry.      Findings: No rash.   Neurological:      General: No focal deficit present.      Mental Status: She is  alert and oriented to person, place, and time.      Cranial Nerves: No cranial nerve deficit.      Sensory: No sensory deficit.      Motor: Weakness (Generalized) present.         Procedures           ED Course  ED Course as of 03/14/24 2314   Thu Mar 14, 2024   2147 The lactic acid is 1.0.  This is normal. [RC]   2148 The white blood cell count is 3.2, the CBC is otherwise unremarkable [RC]   2148 The acetone is negative. [RC]   2148 The glucose is 169, sodium 133, calcium 8.5, CMP is otherwise unremarkable. [RC]   2206 Glucose is 169, sodium 133, calcium 8.5, CMP is otherwise unremarkable. [RC]   2207 The COVID flu is negative. [RC]   2207 The acetone is negative [RC]   2207 The procalcitonin is 0.05 and normal. [RC]   2207 The lactate is 1.0 and normal [RC]   2207 The white blood cell count is 3.2, the CBC is otherwise unremarkable [RC]   2313 The urine hCG is negative. [RC]      ED Course User Index  [RC] Rio Hendrix MD    23:14 EDT, 03/14/24:  The patient was reassessed.  She feels much better.  Her vital signs were reviewed and are stable.  She has tolerated clear liquids.    23:19 EDT, 03/14/24:  The patient's diagnosis acute gastroenteritis was discussed with her.  The patient should drink clear liquids for 24 to 48 hours, then advance diet as tolerated.  The patient will be given a prescription for Zofran.  She should take the Zofran as needed as directed for nausea and vomiting.  The patient should follow-up with Wilma Barragan on Monday.  She should return to the emergency department if there is vomiting not controlled to Zofran, vomiting blood, bloody diarrhea, worse in any way at all.  All patient's questions answered she will be discharged in good condition.  Gastroenteritis                                       Medical Decision Making      Final diagnoses:   Acute gastroenteritis       ED Disposition  ED Disposition       None            No follow-up provider specified.       Medication List       No changes were made to your prescriptions during this visit.            Rio Hendrix MD  03/15/24 [id number]

## 2024-03-18 ENCOUNTER — OFFICE VISIT (OUTPATIENT)
Dept: INTERNAL MEDICINE | Facility: CLINIC | Age: 34
End: 2024-03-18
Payer: COMMERCIAL

## 2024-03-18 VITALS
OXYGEN SATURATION: 100 % | WEIGHT: 197.4 LBS | HEART RATE: 93 BPM | BODY MASS INDEX: 29.92 KG/M2 | HEIGHT: 68 IN | TEMPERATURE: 98 F | DIASTOLIC BLOOD PRESSURE: 78 MMHG | SYSTOLIC BLOOD PRESSURE: 122 MMHG

## 2024-03-18 DIAGNOSIS — R51.9 ACUTE NONINTRACTABLE HEADACHE, UNSPECIFIED HEADACHE TYPE: Primary | ICD-10-CM

## 2024-03-18 DIAGNOSIS — R42 DIZZINESS: ICD-10-CM

## 2024-03-18 DIAGNOSIS — R11.0 NAUSEA: ICD-10-CM

## 2024-03-18 PROCEDURE — 99214 OFFICE O/P EST MOD 30 MIN: CPT | Performed by: NURSE PRACTITIONER

## 2024-03-18 RX ORDER — DULOXETIN HYDROCHLORIDE 30 MG/1
30 CAPSULE, DELAYED RELEASE ORAL DAILY
Qty: 14 CAPSULE | Refills: 0 | Status: SHIPPED | OUTPATIENT
Start: 2024-03-18

## 2024-03-18 NOTE — PROGRESS NOTES
"Subjective   Candice Mckeon is a 33 y.o. female presenting today for follow up of   Chief Complaint   Patient presents with    Hospital Follow Up Visit     Feeling like she has had a reaction to the switching of Cymbalta to pristiq. Has a headache and nausea. ED 3/14. Pt was started on tamiflu but did not complete because she never tested pos for flu         Outpatient Medications Marked as Taking for the 3/18/24 encounter (Office Visit) with Wilma Barragan APRN   Medication Sig Dispense Refill    Continuous Blood Gluc Sensor (Dexcom G6 Sensor) CHANGE SENSOR EVERY 10 DAYS      Continuous Blood Gluc Transmit (Dexcom G6 Transmitter) misc       desvenlafaxine (PRISTIQ) 50 MG 24 hr tablet Take 1 tablet by mouth Daily. 30 tablet 1    Insulin Disposable Pump (Omnipod 5 G6 Intro, Gen 5,) kit       Insulin Disposable Pump (Omnipod 5 G6 Pod, Gen 5,) [facility] [place]       NovoLOG 100 UNIT/ML injection       ondansetron (ZOFRAN) 8 MG tablet Take 1 tablet by mouth Every 8 (Eight) Hours As Needed for Nausea or Vomiting. 10 tablet 0       Pt presents c/o HA, dizziness, brain fog, nausea, and painful joints. Duration of 10 days. Was febrile for approx 24 hours last week. Went to ED dx was viral illness. Fever resolved. Nml BG per CGM.       The following portions of the patient's history were reviewed and updated as appropriate: allergies, current medications, past family history, past medical history, past social history, past surgical history and problem list.        Objective   Vitals:    03/18/24 1047   BP: 122/78   BP Location: Left arm   Patient Position: Sitting   Cuff Size: Adult   Pulse: 93   Temp: 98 °F (36.7 °C)   TempSrc: Infrared   SpO2: 100%   Weight: 89.5 kg (197 lb 6.4 oz)   Height: 172.7 cm (68\")     Vitals:    03/18/24 1047 03/18/24 1113 03/18/24 1114 03/18/24 1115   Orthostatic BP:  126/90 120/86 122/84   Patient Position: Sitting Lying Sitting Standing       BP Readings from Last 3 Encounters:   03/18/24 122/78 "   03/14/24 118/80   02/16/24 124/84        Wt Readings from Last 3 Encounters:   03/18/24 89.5 kg (197 lb 6.4 oz)   03/14/24 88 kg (194 lb)   03/05/24 91.6 kg (202 lb)        Body mass index is 30.01 kg/m².  Nursing notes and vitals reviewed.    Physical Exam  Constitutional:       General: She is not in acute distress.     Appearance: Normal appearance. She is well-developed.   HENT:      Head: Normocephalic.      Right Ear: Hearing, tympanic membrane, ear canal and external ear normal.      Left Ear: Hearing, tympanic membrane, ear canal and external ear normal.      Nose: Nose normal. No mucosal edema or rhinorrhea.      Mouth/Throat:      Mouth: Mucous membranes are moist.      Pharynx: Oropharynx is clear. Uvula midline.   Eyes:      General: Lids are normal.      Extraocular Movements: Extraocular movements intact.      Conjunctiva/sclera: Conjunctivae normal.      Pupils: Pupils are equal, round, and reactive to light.   Neck:      Thyroid: No thyroid mass or thyromegaly.   Cardiovascular:      Rate and Rhythm: Regular rhythm.      Pulses: Normal pulses.      Heart sounds: S1 normal and S2 normal. No murmur heard.     No friction rub. No gallop.   Pulmonary:      Effort: Pulmonary effort is normal.      Breath sounds: Normal breath sounds. No wheezing, rhonchi or rales.   Abdominal:      General: Bowel sounds are normal.      Palpations: Abdomen is soft.      Tenderness: There is no abdominal tenderness. There is no guarding.      Hernia: No hernia is present.   Musculoskeletal:         General: No deformity. Normal range of motion.      Cervical back: Normal range of motion and neck supple.   Lymphadenopathy:      Cervical: No cervical adenopathy.   Skin:     General: Skin is warm and dry.      Findings: No lesion or rash.   Neurological:      General: No focal deficit present.      Mental Status: She is alert and oriented to person, place, and time.      Cranial Nerves: No cranial nerve deficit.       Sensory: No sensory deficit.      Motor: Motor function is intact.      Coordination: Coordination is intact.      Gait: Gait normal.      Deep Tendon Reflexes: Reflexes are normal and symmetric.   Psychiatric:         Attention and Perception: She is attentive.         Mood and Affect: Mood and affect normal.         Speech: Speech normal.         Behavior: Behavior normal.         Thought Content: Thought content normal.         Recent Results (from the past 672 hour(s))   Comprehensive Metabolic Panel    Collection Time: 03/14/24  9:14 PM    Specimen: Arm, Right; Blood   Result Value Ref Range    Glucose 169 (H) 65 - 99 mg/dL    BUN 11 6 - 20 mg/dL    Creatinine 0.96 0.57 - 1.00 mg/dL    Sodium 133 (L) 136 - 145 mmol/L    Potassium 3.5 3.5 - 5.2 mmol/L    Chloride 99 98 - 107 mmol/L    CO2 23.5 22.0 - 29.0 mmol/L    Calcium 8.5 (L) 8.6 - 10.5 mg/dL    Total Protein 7.5 6.0 - 8.5 g/dL    Albumin 3.9 3.5 - 5.2 g/dL    ALT (SGPT) 19 1 - 33 U/L    AST (SGOT) 21 1 - 32 U/L    Alkaline Phosphatase 73 39 - 117 U/L    Total Bilirubin 0.2 0.0 - 1.2 mg/dL    Globulin 3.6 gm/dL    A/G Ratio 1.1 g/dL    BUN/Creatinine Ratio 11.5 7.0 - 25.0    Anion Gap 10.5 5.0 - 15.0 mmol/L    eGFR 79.8 >60.0 mL/min/1.73   Lactic Acid, Plasma    Collection Time: 03/14/24  9:14 PM    Specimen: Arm, Right; Blood   Result Value Ref Range    Lactate 1.0 0.5 - 2.0 mmol/L   Blood Culture - Blood, Arm, Right    Collection Time: 03/14/24  9:14 PM    Specimen: Arm, Right; Blood   Result Value Ref Range    Blood Culture No growth at 3 days    CBC Auto Differential    Collection Time: 03/14/24  9:14 PM    Specimen: Arm, Right; Blood   Result Value Ref Range    WBC 3.20 (L) 3.40 - 10.80 10*3/mm3    RBC 4.67 3.77 - 5.28 10*6/mm3    Hemoglobin 13.6 12.0 - 15.9 g/dL    Hematocrit 39.9 34.0 - 46.6 %    MCV 85.4 79.0 - 97.0 fL    MCH 29.1 26.6 - 33.0 pg    MCHC 34.1 31.5 - 35.7 g/dL    RDW 12.3 12.3 - 15.4 %    RDW-SD 38.3 37.0 - 54.0 fl    MPV 10.7 6.0 -  12.0 fL    Platelets 148 140 - 450 10*3/mm3    Neutrophil % 72.1 42.7 - 76.0 %    Lymphocyte % 11.3 (L) 19.6 - 45.3 %    Monocyte % 12.2 (H) 5.0 - 12.0 %    Eosinophil % 3.8 0.3 - 6.2 %    Basophil % 0.3 0.0 - 1.5 %    Immature Grans % 0.3 0.0 - 0.5 %    Neutrophils, Absolute 2.31 1.70 - 7.00 10*3/mm3    Lymphocytes, Absolute 0.36 (L) 0.70 - 3.10 10*3/mm3    Monocytes, Absolute 0.39 0.10 - 0.90 10*3/mm3    Eosinophils, Absolute 0.12 0.00 - 0.40 10*3/mm3    Basophils, Absolute 0.01 0.00 - 0.20 10*3/mm3    Immature Grans, Absolute 0.01 0.00 - 0.05 10*3/mm3    nRBC 0.0 0.0 - 0.2 /100 WBC   Green Top (Gel)    Collection Time: 03/14/24  9:14 PM   Result Value Ref Range    Extra Tube Hold for add-ons.    Lavender Top    Collection Time: 03/14/24  9:14 PM   Result Value Ref Range    Extra Tube hold for add-on    Gold Top - SST    Collection Time: 03/14/24  9:14 PM   Result Value Ref Range    Extra Tube Hold for add-ons.    Light Blue Top    Collection Time: 03/14/24  9:14 PM   Result Value Ref Range    Extra Tube Hold for add-ons.    Procalcitonin    Collection Time: 03/14/24  9:14 PM    Specimen: Arm, Right; Blood   Result Value Ref Range    Procalcitonin 0.05 0.00 - 0.25 ng/mL   Acetone    Collection Time: 03/14/24  9:14 PM    Specimen: Arm, Right; Blood   Result Value Ref Range    Acetone Negative Negative   COVID-19 and FLU A/B PCR, 1 HR TAT - Swab, Nasopharynx    Collection Time: 03/14/24  9:16 PM    Specimen: Nasopharynx; Swab   Result Value Ref Range    COVID19 Not Detected Not Detected - Ref. Range    Influenza A PCR Not Detected Not Detected    Influenza B PCR Not Detected Not Detected   Urinalysis With Microscopic If Indicated (No Culture) - Urine, Clean Catch    Collection Time: 03/14/24 10:21 PM    Specimen: Urine, Clean Catch   Result Value Ref Range    Color, UA Yellow Yellow, Straw    Appearance, UA Clear Clear    pH, UA 5.5 4.5 - 8.0    Specific Gravity, UA 1.028 1.003 - 1.030    Glucose, UA Negative  Negative    Ketones, UA 15 mg/dL (1+) (A) Negative    Bilirubin, UA Negative Negative    Blood, UA Negative Negative    Protein, UA 30 mg/dL (1+) (A) Negative    Leuk Esterase, UA Negative Negative    Nitrite, UA Negative Negative    Urobilinogen, UA 1.0 E.U./dL 0.2 - 1.0 E.U./dL   Urinalysis, Microscopic Only - Urine, Clean Catch    Collection Time: 03/14/24 10:21 PM    Specimen: Urine, Clean Catch   Result Value Ref Range    RBC, UA None Seen None Seen, 0-2 /HPF    WBC, UA 0-2 None Seen, 0-2 /HPF    Bacteria, UA 4+ (A) None Seen /HPF    Squamous Epithelial Cells, UA 13-20 (A) None Seen, 0-2 /HPF    Transitional Epithelial Cells, UA 0-2 0 - 2 /HPF    Hyaline Casts, UA 0-2 None Seen /LPF    Granular Casts, UA 0-2 None Seen /LPF    Mucus, UA Trace None Seen, Trace /HPF    Methodology Manual Light Microscopy    Pregnancy, Urine - Urine, Clean Catch    Collection Time: 03/14/24 10:21 PM    Specimen: Urine, Clean Catch   Result Value Ref Range    HCG, Urine QL Negative Negative         Assessment & Plan   Diagnoses and all orders for this visit:    1. Acute nonintractable headache, unspecified headache type (Primary)    2. Dizziness    3. Nausea    Other orders  -     DULoxetine (Cymbalta) 30 MG capsule; Take 1 capsule by mouth Daily.  Dispense: 14 capsule; Refill: 0        ?Etiology?  Recently switched from Cymbalta to Pristiq but unlikely to cause S&S of withdrawal but to same class.  Labs in ED support dx of viral illness. Pt is teacher and numerous flu contacts.  Will have her continue Pristiq and restart low dose of Cymbalta.  Monitor closely.      Medications, including side effects, were discussed with the patient. Patient verbalized understanding.  The plan of care was discussed. All questions were answered. Patient verbalized understanding.      Return in about 4 days (around 3/22/2024).    I spent 34 minutes caring for Candice on this date of service. This time includes time spent by me in the following  activities:preparing for the visit, reviewing tests, obtaining and/or reviewing a separately obtained history, performing a medically appropriate examination and/or evaluation , counseling and educating the patient/family/caregiver, ordering medications, tests, or procedures, documenting information in the medical record, independently interpreting results and communicating that information with the patient/family/caregiver, and care coordination

## 2024-03-19 ENCOUNTER — TELEPHONE (OUTPATIENT)
Dept: INTERNAL MEDICINE | Facility: CLINIC | Age: 34
End: 2024-03-19
Payer: COMMERCIAL

## 2024-03-19 LAB — BACTERIA SPEC AEROBE CULT: NORMAL

## 2024-03-19 NOTE — TELEPHONE ENCOUNTER
"Relay     \"  If there is no CP, SOB, or trouble swallowing, then no need to go to ER. I agree she should stop Pristiq. She should also take Benadryl 50mg for itching and swelling. She should go to ER if she has CP, SOB, or trouble swallowing.      \"                  "

## 2024-03-19 NOTE — TELEPHONE ENCOUNTER
Spoke with pt. Fingers are hurting badly, pain with moving hands, feel like her hands are constantly asleep. Feet are itchy and remain swollen feeling. No chest tightness/SOB/throat irritation. Doesn't think it is an allergic reaction but thinks side effect of pristiq ? No vertigo or headache today after starting the low dose cymbalta yesterday after appt. She wants to stop the pristiq.

## 2024-03-19 NOTE — TELEPHONE ENCOUNTER
Caller: Candice Mckeon    Relationship to patient: Self    Best call back number: [phone]     Chief complaint: PATIENT STATES HER HANDS AND FEET ARE SWOLLEN, TINGLING AND ITCHING     PATIENT STATES HER KNEES ARE SWOLLEN     PATIENT STATES SHE WAS SEEN YESTERDAY AND THINKS SHE IS HAVING AN ALLERGIC REACTION     Patient directed to call 911 or go to their nearest emergency room.     Patient verbalized understanding: [x] Yes  [] No  If no, why?    Additional notes:PATIENT STATES SHE WILL THINK ABOUT GOING TO THE EMERGENCY ROOM    I ASKED PATIENT WOULD SHE LIKE TO SPEAK WITH THE CLINICAL STAFF AND SHE DECLINED

## 2024-03-19 NOTE — TELEPHONE ENCOUNTER
Name: Candice Mckeon      Relationship: Self      Best Callback Number: [phone]      HUB PROVIDED THE RELAY MESSAGE FROM THE OFFICE      PATIENT: VOICED UNDERSTANDING AND HAS NO FURTHER QUESTIONS AT THIS TIME    ADDITIONAL INFORMATION: N/A

## 2024-03-19 NOTE — TELEPHONE ENCOUNTER
If there is no CP, SOB, or trouble swallowing, then no need to go to ER. I agree she should stop Pristiq. She should also take Benadryl 50mg for itching and swelling. She should go to ER if she has CP, SOB, or trouble swallowing.

## 2024-03-28 RX ORDER — DULOXETIN HYDROCHLORIDE 30 MG/1
30 CAPSULE, DELAYED RELEASE ORAL DAILY
Qty: 30 CAPSULE | Refills: 0 | Status: SHIPPED | OUTPATIENT
Start: 2024-03-28

## 2024-03-28 NOTE — TELEPHONE ENCOUNTER
Patient comment: I prefer taking this smaller dose for now.   Rx Refill Note  Requested Prescriptions     Pending Prescriptions Disp Refills    DULoxetine (Cymbalta) 30 MG capsule 14 capsule 0     Sig: Take 1 capsule by mouth Daily.      Last office visit with prescribing clinician: 3/18/2024   Last telemedicine visit with prescribing clinician: 3/5/2024   Next office visit with prescribing clinician: 4/4/2024                         Would you like a call back once the refill request has been completed: [] Yes [] No    If the office needs to give you a call back, can they leave a voicemail: [] Yes [] No    Shira Caldwell MA  03/28/24, 08:38 EDT

## 2024-04-18 ENCOUNTER — TELEPHONE (OUTPATIENT)
Dept: INTERNAL MEDICINE | Facility: CLINIC | Age: 34
End: 2024-04-18
Payer: COMMERCIAL

## 2024-04-18 ENCOUNTER — NURSE TRIAGE (OUTPATIENT)
Dept: CALL CENTER | Facility: HOSPITAL | Age: 34
End: 2024-04-18
Payer: COMMERCIAL

## 2024-04-18 NOTE — TELEPHONE ENCOUNTER
Reason for Disposition   SEVERE coughing spells (e.g., whooping sound after coughing, vomiting after coughing)    Additional Information   Negative: SEVERE difficulty breathing (e.g., struggling for each breath, speaks in single words)   Negative: Bluish (or gray) lips or face now   Negative: [1] Difficulty breathing AND [2] exposure to flames, smoke, or fumes   Negative: [1] Stridor AND [2] difficulty breathing   Negative: Sounds like a life-threatening emergency to the triager   Negative: [1] Previous asthma attacks AND [2] this feels like asthma attack   Negative: Dry cough (non-productive;  no sputum or minimal clear sputum)   Negative: [1] MODERATE difficulty breathing (e.g., speaks in phrases, SOB even at rest, pulse 100-120) AND [2] still present when not coughing   Negative: Chest pain  (Exception: MILD central chest pain, present only when coughing.)   Negative: Patient sounds very sick or weak to the triager   Negative: [1] MILD difficulty breathing (e.g., minimal/no SOB at rest, SOB with walking, pulse <100) AND [2] still present when not coughing   Negative: [1] Coughed up blood AND [2] > 1 tablespoon (15 ml)   (Exception: Blood-tinged sputum.)   Negative: Fever > 103 F (39.4 C)   Negative: [1] Fever > 101 F (38.3 C) AND [2] age > 60 years   Negative: [1] Fever > 100.0 F (37.8 C) AND [2] bedridden (e.g., CVA, chronic illness, recovering from surgery)   Negative: [1] Fever > 100.0 F (37.8 C) AND [2] diabetes mellitus or weak immune system (e.g., HIV positive, cancer chemo, splenectomy, organ transplant, chronic steroids)   Negative: Wheezing is present   Negative: [1] Continuous (nonstop) coughing interferes with work or school AND [2] no improvement using cough treatment per Care Advice   Negative: Coughing up devon-colored (reddish-brown) sputum   Negative: Fever present > 3 days (72 hours)   Negative: [1] Fever returns after gone for over 24 hours AND [2] symptoms worse or not improved   Negative: [1]  "Using nasal washes and pain medicine > 24 hours AND [2] sinus pain (around cheekbone or eye) persists   Negative: Earache   Negative: [1] Known COPD or other severe lung disease (i.e., bronchiectasis, cystic fibrosis, lung surgery) AND [2] worsening symptoms (i.e., increased sputum purulence or amount, increased breathing difficulty    Answer Assessment - Initial Assessment Questions  1. ONSET: \"When did the cough begin?\"       2 weeks a go  2. SEVERITY: \"How bad is the cough today?\"       worsening  3. SPUTUM: \"Describe the color of your sputum\" (none, dry cough; clear, white, yellow, green)      Thick yellow  4. HEMOPTYSIS: \"Are you coughing up any blood?\" If so ask: \"How much?\" (flecks, streaks, tablespoons, etc.)      no  5. DIFFICULTY BREATHING: \"Are you having difficulty breathing?\" If Yes, ask: \"How bad is it?\" (e.g., mild, moderate, severe)     - MILD: No SOB at rest, mild SOB with walking, speaks normally in sentences, can lie down, no retractions, pulse < 100.     - MODERATE: SOB at rest, SOB with minimal exertion and prefers to sit, cannot lie down flat, speaks in phrases, mild retractions, audible wheezing, pulse 100-120.     - SEVERE: Very SOB at rest, speaks in single words, struggling to breathe, sitting hunched forward, retractions, pulse > 120       Mod after a coughing spell  6. FEVER: \"Do you have a fever?\" If Yes, ask: \"What is your temperature, how was it measured, and when did it start?\"      no  7. CARDIAC HISTORY: \"Do you have any history of heart disease?\" (e.g., heart attack, congestive heart failure)       no  8. LUNG HISTORY: \"Do you have any history of lung disease?\"  (e.g., pulmonary embolus, asthma, emphysema)      no  9. PE RISK FACTORS: \"Do you have a history of blood clots?\" (or: recent major surgery, recent prolonged travel, bedridden)      no  10. OTHER SYMPTOMS: \"Do you have any other symptoms?\" (e.g., runny nose, wheezing, chest pain)        no  11. PREGNANCY: \"Is there any " "chance you are pregnant?\" \"When was your last menstrual period?\"        no  12. TRAVEL: \"Have you traveled out of the country in the last month?\" (e.g., travel history, exposures)        no    Protocols used: Cough - Acute Productive-ADULT-AH    "

## 2024-04-18 NOTE — TELEPHONE ENCOUNTER
Call transferred from the HUB, unable to reach the office.  Caller has had a cough for 2 weeks, started out dry but then wet cough with yellow sputum.  Last night she had a coughing spell and finally was able to produce a small amount of thick yellow sputum.  She states that she was SOA and wheezing during and after the coughing spell. Warm transfer to [facility] in the office.

## 2024-04-18 NOTE — TELEPHONE ENCOUNTER
I called Ms. Mckeon due to her current messages she left today.  I tried to get her in tomorrow at 11 but she can not come at this time.  She spoke with a triage RN today.  Candice said she is going to Urgent Care after work tonight.

## 2024-04-19 NOTE — TELEPHONE ENCOUNTER
OKAY HUB TO READ  I left her a message to see if she went to Urgent Care or the ER.  I have a later apt this afternoon and wanted to see if she can come in.

## 2024-04-19 NOTE — TELEPHONE ENCOUNTER
I spoke with her this morning and she went to the [facility] last night.  She got a new antibiotic and will try this for a week.  She will call back next week if she is not any better.

## 2024-04-23 ENCOUNTER — HOSPITAL ENCOUNTER (OUTPATIENT)
Dept: GENERAL RADIOLOGY | Facility: HOSPITAL | Age: 34
Discharge: HOME | End: 2024-04-23
Admitting: NURSE PRACTITIONER
Payer: COMMERCIAL

## 2024-04-23 ENCOUNTER — OFFICE VISIT (OUTPATIENT)
Dept: INTERNAL MEDICINE | Facility: CLINIC | Age: 34
End: 2024-04-23
Payer: COMMERCIAL

## 2024-04-23 VITALS
BODY MASS INDEX: 30.16 KG/M2 | HEIGHT: 68 IN | WEIGHT: 199 LBS | OXYGEN SATURATION: 97 % | DIASTOLIC BLOOD PRESSURE: 82 MMHG | SYSTOLIC BLOOD PRESSURE: 120 MMHG | TEMPERATURE: 98.6 F | HEART RATE: 95 BPM

## 2024-04-23 DIAGNOSIS — J40 BRONCHITIS: Primary | ICD-10-CM

## 2024-04-23 PROCEDURE — 71046 X-RAY EXAM CHEST 2 VIEWS: CPT

## 2024-04-23 PROCEDURE — 99214 OFFICE O/P EST MOD 30 MIN: CPT | Performed by: NURSE PRACTITIONER

## 2024-04-23 RX ORDER — FLUCONAZOLE 150 MG/1
TABLET ORAL
COMMUNITY
Start: 2024-04-18

## 2024-04-23 RX ORDER — BENZONATATE 200 MG/1
CAPSULE ORAL
COMMUNITY
Start: 2024-04-13 | End: 2024-04-23

## 2024-04-23 RX ORDER — BUDESONIDE AND FORMOTEROL FUMARATE DIHYDRATE 160; 4.5 UG/1; UG/1
2 AEROSOL RESPIRATORY (INHALATION)
Qty: 10.2 G | Refills: 0 | Status: SHIPPED | OUTPATIENT
Start: 2024-04-23

## 2024-04-23 RX ORDER — DOXYCYCLINE 100 MG/1
CAPSULE ORAL
COMMUNITY
Start: 2024-04-18

## 2024-04-23 NOTE — PROGRESS NOTES
Candice Mckeon is a 34 y.o. female presenting today for   Chief Complaint   Patient presents with    URI     Started with dry cough and ear pain. Did a telehealth through work 2 weeks ago and he said it was a sinus infection. Did round on amox and cough med. Went to [facility] 4/19 and was given a stronger antibiotic which she has been taking since Thursday PM. Taking mucinex in AM. Using humidifier, steam shower, propping up at night, taking zyrtec at night. Still having cough and SOA. Cough is worse in the evening. Pt states mom and coworker had similar cough and allegraD was the only thing that helped.        Subjective      Mild occasional cough that began 3 weeks ago. Attributed this to allergies and addressed as such. 2wks ago began experiencing sinus pressure and ear pain. Telehealth visit and was prescribed Amox and cough perle. No improvement. 5 days ago went to [facility] with SOB. Abx was changed to Doxy. Has also tried Mucinex. Cough is productive of yellow phlegm. She is  using humidifier, vicks, steam, sleeping w/ pillows to prop. Cough is worse when lying down. Has also tried Zyrtec.      The following portions of the patient's history were reviewed and updated as appropriate: allergies, current medications, problem list, past medical history, past surgical history, family history, and social history.    Review of Systems   Constitutional:  Negative for chills, fever and unexpected weight loss.   HENT:  Positive for congestion and postnasal drip. Negative for ear pain, rhinorrhea and sore throat.    Respiratory:  Positive for cough, chest tightness, shortness of breath and wheezing.    Cardiovascular:  Negative for chest pain.   Gastrointestinal:  Positive for vomiting (from coughing). Negative for diarrhea.   Musculoskeletal:  Negative for myalgias.   Skin:  Negative for rash.   Neurological:  Positive for headache (from cough). Negative for dizziness.         Objective    Vitals:    04/23/24 1054  "  BP: 120/82   BP Location: Left arm   Patient Position: Sitting   Cuff Size: Adult   Pulse: 95   Temp: 98.6 °F (37 °C)   TempSrc: Infrared   SpO2: 97%   Weight: 90.3 kg (199 lb)   Height: 172.7 cm (68\")     Body mass index is 30.26 kg/m².  Nursing notes and vitals reviewed.    Physical Exam  Constitutional:       General: She is not in acute distress.     Appearance: She is well-developed.   HENT:      Head: Normocephalic.      Right Ear: Hearing, tympanic membrane, ear canal and external ear normal.      Left Ear: Hearing, tympanic membrane, ear canal and external ear normal.      Nose: Nose normal.      Right Sinus: No maxillary sinus tenderness or frontal sinus tenderness.      Left Sinus: No maxillary sinus tenderness or frontal sinus tenderness.      Mouth/Throat:      Mouth: Mucous membranes are moist.      Pharynx: Oropharynx is clear. Uvula midline.   Neck:      Thyroid: No thyroid mass or thyromegaly.   Cardiovascular:      Rate and Rhythm: Regular rhythm.      Pulses: Normal pulses.      Heart sounds: S1 normal and S2 normal. No murmur heard.     No friction rub. No gallop.   Pulmonary:      Effort: Pulmonary effort is normal.      Breath sounds: No decreased air movement. Wheezing (throughout all field; end-inspiratory) and rhonchi present. No rales.   Musculoskeletal:      Cervical back: Neck supple.   Lymphadenopathy:      Cervical: No cervical adenopathy.   Neurological:      Mental Status: She is alert and oriented to person, place, and time.      Cranial Nerves: No cranial nerve deficit.      Sensory: No sensory deficit.   Psychiatric:         Attention and Perception: She is attentive.         Speech: Speech normal.         Behavior: Behavior normal.           Assessment and Plan    Diagnoses and all orders for this visit:    1. Bronchitis (Primary)  -     XR Chest PA & Lateral  -     Albuterol-Budesonide 90-80 MCG/ACT aerosol; Inhale 2 puffs Every 4 (Four) Hours As Needed (wheezng, sob).  " Dispense: 10.7 g; Refill: 0  -     budesonide-formoterol (Symbicort) 160-4.5 MCG/ACT inhaler; Inhale 2 puffs 2 (Two) Times a Day.  Dispense: 10.2 g; Refill: 0      Continue Zyrtec.  Complete Doxy.  Change Mucinex to Mucinex D.  Would typically utilize oral corticosteroid but choosing to avoid in setting of IDDM.      Medications, including side effects, were discussed with the patient. Patient verbalized understanding.  The plan of care was discussed. All questions were answered. Patient verbalized understanding.        Return if symptoms worsen or fail to improve.

## 2024-04-29 DIAGNOSIS — J40 BRONCHITIS: ICD-10-CM

## 2024-04-29 RX ORDER — ALBUTEROL SULFATE AND BUDESONIDE 90; 80 UG/1; UG/1
2 AEROSOL, METERED RESPIRATORY (INHALATION) EVERY 4 HOURS PRN
Qty: 10.7 G | Refills: 0 | Status: SHIPPED | OUTPATIENT
Start: 2024-04-29

## 2024-04-29 RX ORDER — DULOXETIN HYDROCHLORIDE 30 MG/1
30 CAPSULE, DELAYED RELEASE ORAL DAILY
Qty: 30 CAPSULE | Refills: 0 | Status: SHIPPED | OUTPATIENT
Start: 2024-04-29

## 2024-04-29 NOTE — TELEPHONE ENCOUNTER
Rx Refill Note  Requested Prescriptions     Pending Prescriptions Disp Refills    DULoxetine (Cymbalta) 30 MG capsule 30 capsule 0     Sig: Take 1 capsule by mouth Daily.      Last office visit with prescribing clinician: 4/23/2024   Last telemedicine visit with prescribing clinician: 3/5/2024   Next office visit with prescribing clinician: 4/29/2024                         Would you like a call back once the refill request has been completed: [] Yes [] No    If the office needs to give you a call back, can they leave a voicemail: [] Yes [] No    Shira Caldwell MA  04/29/24, 08:48 EDT

## 2024-05-07 RX ORDER — FLUCONAZOLE 150 MG/1
TABLET ORAL
Qty: 3 TABLET | Refills: 0 | Status: SHIPPED | OUTPATIENT
Start: 2024-05-07

## 2024-05-15 DIAGNOSIS — F41.8 DEPRESSION WITH ANXIETY: ICD-10-CM

## 2024-05-15 NOTE — TELEPHONE ENCOUNTER
The original prescription was discontinued on 3/5/2024 by Wilma Barragan APRN for the following reason: Not Efficacious.       Rx Refill Note  Requested Prescriptions     Pending Prescriptions Disp Refills    DULoxetine (CYMBALTA) 60 MG capsule [Pharmacy Med Name: DULOXETINE HCL DR 60 MG CAP] 90 capsule 0     Sig: TAKE 1 CAPSULE BY MOUTH EVERY DAY      Last office visit with prescribing clinician: 4/23/2024   Last telemedicine visit with prescribing clinician: 3/5/2024   Next office visit with prescribing clinician: Visit date not found                         Would you like a call back once the refill request has been completed: [] Yes [] No    If the office needs to give you a call back, can they leave a voicemail: [] Yes [] No    Shira Caldwell MA  05/15/24, 14:41 EDT     Patient is a 26 yo unemployed male; domiciled with dad; single; non caregiver; with self reported psych hx of schizophrenia; multiple prior hospitalizations (states most recently at [facility]); denies prior SA or NSSIB; denies alcohol or drug use; denies medical hx; unknown hx of aggression or legal charges who presents via EMS for psychotic behavior. BAL negative, Utox pending. Patient initially reported difficulty with walking, however was noted to be walking around ED. CT head was negative. On interview, patient is endorsing paranoid and Oriental orthodox delusions with PMA. Waiting call back from dad for collateral. Holding for bed with planning involuntary admission if patient continues to be this symptomatic pending bed availability and Covid testing.

## 2024-05-16 RX ORDER — DULOXETIN HYDROCHLORIDE 30 MG/1
30 CAPSULE, DELAYED RELEASE ORAL DAILY
Qty: 30 CAPSULE | Refills: 0 | OUTPATIENT
Start: 2024-05-16

## 2024-05-16 RX ORDER — DULOXETIN HYDROCHLORIDE 60 MG/1
CAPSULE, DELAYED RELEASE ORAL
Qty: 90 CAPSULE | Refills: 0 | OUTPATIENT
Start: 2024-05-16

## 2024-05-17 RX ORDER — DULOXETIN HYDROCHLORIDE 30 MG/1
30 CAPSULE, DELAYED RELEASE ORAL DAILY
Qty: 30 CAPSULE | Refills: 0 | Status: SHIPPED | OUTPATIENT
Start: 2024-05-17

## 2024-05-17 NOTE — TELEPHONE ENCOUNTER
"Relay     \"Please confirm dosage for Cymbalta. Our records show pt is taking 30mg but we received refill request for 60mg.\"                  "

## 2024-05-17 NOTE — TELEPHONE ENCOUNTER
Name: Candice Mckeon      Relationship: Self      Best Callback Number: [phone]*      HUB PROVIDED THE RELAY MESSAGE FROM THE OFFICE      PATIENT: VOICED UNDERSTANDING AND HAS NO FURTHER QUESTIONS AT THIS TIME    ADDITIONAL INFORMATION: PATIENT STATES THAT THE DOSE SHOULD BE 30 MG. THE PATIENT STATES THAT SHE IS WEANING OFF THE CYMBALTA TO START SOMETHING ELSE, BUT DOES NOT KNOW WHAT MEDICATION SHE WILL BE TAKING AFTER SHE WEANS OFF THE CYMBALTA, UNTILS SHE SEES HER PSYCHIATRIST, ON 6/5/24.

## 2024-05-19 DIAGNOSIS — J40 BRONCHITIS: ICD-10-CM

## 2024-05-19 NOTE — TELEPHONE ENCOUNTER
Rx Refill Note  Requested Prescriptions     Pending Prescriptions Disp Refills    Symbicort 160-4.5 MCG/ACT inhaler [Pharmacy Med Name: SYMBICORT 160-4.5 MCG INHALER] 10.2 g 0     Sig: INHALE 2 PUFFS BY MOUTH TWICE A DAY      Last office visit with prescribing clinician: 4/23/2024   Last telemedicine visit with prescribing clinician: 3/5/2024   Next office visit with prescribing clinician: Visit date not found                         Would you like a call back once the refill request has been completed: [] Yes [] No    If the office needs to give you a call back, can they leave a voicemail: [] Yes [] No    Sandhya Narvaez MA  05/19/24, 09:31 EDT

## 2024-05-20 RX ORDER — BUDESONIDE AND FORMOTEROL FUMARATE DIHYDRATE 160; 4.5 UG/1; UG/1
2 AEROSOL RESPIRATORY (INHALATION) 2 TIMES DAILY
Qty: 10.2 G | Refills: 0 | Status: SHIPPED | OUTPATIENT
Start: 2024-05-20

## 2024-05-31 ENCOUNTER — OFFICE VISIT (OUTPATIENT)
Age: 34
End: 2024-05-31
Payer: COMMERCIAL

## 2024-05-31 VITALS
SYSTOLIC BLOOD PRESSURE: 132 MMHG | DIASTOLIC BLOOD PRESSURE: 93 MMHG | OXYGEN SATURATION: 99 % | HEIGHT: 68 IN | WEIGHT: 200 LBS | HEART RATE: 73 BPM | BODY MASS INDEX: 30.31 KG/M2

## 2024-05-31 DIAGNOSIS — F43.23 ADJUSTMENT DISORDER WITH MIXED ANXIETY AND DEPRESSED MOOD: Primary | ICD-10-CM

## 2024-05-31 RX ORDER — FLUOXETINE 10 MG/1
10 CAPSULE ORAL DAILY
Qty: 30 CAPSULE | Refills: 1 | Status: SHIPPED | OUTPATIENT
Start: 2024-05-31 | End: 2024-07-30

## 2024-05-31 NOTE — PROGRESS NOTES
"Chief Complaint: \"Depression and anxiety\"     Subjective      Candice Mckeon presents to [facility] for a mental health evaluation.    HPI :     Pt is a 34 y.o. yo female who is being seen here at the clinic for a mental health evaluation.  Patient has a history of GENA with panic attacks and MDD.  Patient reports she experienced anxiety in elementary school but her anxiety and depression started to worsen in eighth grade.  Patient's anxiety first started out with anxiety related to having to void frequently due to having type 1 diabetes.  Patient expressed that she would have panic attacks in fear that teachers would not let her go to the bathroom.  In eighth grade patient was started on Lexapro and continued until she was 23.  Lexapro was discontinued due to it no longer managing her symptoms.  Patient was then started on Prozac. Prozac was discontinued due to what she thought at the time was sexual side effects.  Patient was then started on Cymbalta to hopefully help with the \"sexual side effects\" that she thought she is experiencing.  Patient then discovered that she did not think it was the medication that was causing her low libido but that it was that her and her  at the time were no longer compatible.  Patient and her  at the time  in January 2023.  When the patient went to her ex-'s house to collect the last of her things she saw another woman living in.  Patient reports that she had no idea that her ex- was seeing anyone and was caught very offguard.  Patient reports that she was very sad that she was replaced so quickly and not told about her prior to her moving in with him.  Patient has had a hard time adapting to sharing parenting roles with this other woman.  Patient was also had a hard time adapting to having her son 50% of time rather than all the time.  States that since her divorce her anxiety and depression have worsened. Patient " "has been on Cymbalta 60 mg daily for 6 years but felt the medication stopped working.  Patient completed [employer] testing.  Patient's primary care switched her to Pristiq without any taper of the Cymbalta.  Shortly after starting Pristiq patient's hands became very swollen and itchy.  Patient's primary care then put the patient back on Cymbalta 30 mg daily and referred to psychiatry.     Patient reports the following depressive symptoms:depressed mood, diminished interest or pleasure in activities, decreased appetite, increased appetite, hypersomnia, fatigue or loss of energy, and inappropriate guilt.  Reports that for years her depressive symptoms do significantly worsen the week prior to her menses.  Patient reports the following symptoms of anxiety:Excessive anxiety and worry, Difficulty controlling the worry, easily fatigued, difficulty concentrating , irritability, and muscle tension.  Patient has also been experiencing panic attacks.  Patient reports the following symptoms during her panic attacks: Palpitations, Sweating, Trembling or shaking, Sensations of shortness of breath or smothering, Dizziness, Chills, Heat sensations, and Fear of \"going crazy\" or losing control. Last panic attack was on Saturday.  Patient's panic attacks have been occurring about twice a week recently.  Patient reports to worry about a variety of things including her son and making sure he is taking care of.  Additionally, patient does report that she picks scabs on her skin and does not allow them to heal but is currently not disruptive to her life. Patient has been sleeping well and getting about 8 to 10 hours each night.  Reports her appetite fluctuates.  Denies SI/HI/AVH.     Psychiatric Review of Systems:   Depression: depressed mood, diminished interest or pleasure in activities, decreased appetite, increased appetite, hypersomnia, fatigue or loss of energy, and inappropriate guilt    Ofelia: Patient denies symptoms of " "rashard.   Anxiety: Excessive anxiety and worry, Difficulty controlling the worry, easily fatigued, difficulty concentrating , irritability, and muscle tension   Psychosis: Patient denies symptoms of psychosis.  Panic Attacks: Palpitations, Sweating, Trembling or shaking, Sensations of shortness of breath or smothering, Dizziness, Chills, Heat sensations, and Fear of \"going crazy\" or losing control. Last panic attack was on Saturday.  Patient's panic attacks have been occurring about twice a week recently.    Agoraphobia: Patient denies symptoms of agoraphobia.   OCD: Patient does prefer the volume on her TV be on an even number, but does deny other symptoms of OCD.     Eating Disorders: Denies symptoms of an eating disorder.   PTSD: Patient denies symptoms of PTSD.  Specific Phobias: Patient denies any phobias.  Borderline Personality DO: Patient denies symptoms of borderline first disorder.  Antisocial Personality DO: Patient has symptoms of antisocial personality disorder.    Past Psychiatric History:   Diagnoses: GENA, MDD  Hospitalizations: Denies.   Counseling: Therapy since Feb. 2023.   Suicide attempts: Denies.   Self Harm: Denies.     Previous Psych Meds:   Lexapro (stopped working), Prozac, Pristiq (finger swelling) and Cymbalta (stopped working).     Substance Use/Abuse:   Caffeine: One diet coke daily.   Alcohol: Denies.   Tobacco: Denies.   Illicit substances: Denies.   IVDU: Denies.   History of formal substance abuse treatment: Denies.      Social History:    Family structure: Lives with son. Moving in with boyfriend soon.    Education: Masters    Employment: Teacher    Supportive relationships: Boyfriend, mom, sister, and friends.   Scientology/Martha: Not Anabaptist     Abuse History:    Denies.     Legal History:    Incarceration: Denies.    History of violence: Denies.      History: Denies.     Past Medical/Developmental History:    Chronic Illnesses: Type 1 DM   Head trauma: Denies.     Past " Medical History:   Diagnosis Date    Depression with anxiety 11/15/2021    Diabetes mellitus     Right knee meniscal tear 02/13/2019    Formatting of this note might be different from the original. Added automatically from request for surgery [id number]        Family Psychiatric History:    Psychiatric history: Mom has anxiety and panic attacks. Sister has agoraphobia and OCD.    Substance use: Denies.     Current Medications:   Current Outpatient Medications   Medication Sig Dispense Refill    Continuous Blood Gluc Sensor (Dexcom G6 Sensor) CHANGE SENSOR EVERY 10 DAYS      Continuous Blood Gluc Transmit (Dexcom G6 Transmitter) misc       Insulin Disposable Pump (Omnipod 5 G6 Intro, Gen 5,) kit       Insulin Disposable Pump (Omnipod 5 G6 Pod, Gen 5,) misc       NovoLOG 100 UNIT/ML injection       Airsupra 90-80 MCG/ACT aerosol INHALE 2 PUFFS BY MOUTH EVERY 4 HOURS AS NEEDED FOR WHEEZING OR SHORTNESS OF BREATH 10.7 g 0    doxycycline (MONODOX) 100 MG capsule       fluconazole (DIFLUCAN) 150 MG tablet Take one tablet PO every 72 hours for 3 doses. (Patient not taking: Reported on 5/31/2024) 3 tablet 0    FLUoxetine (PROzac) 10 MG capsule Take 1 capsule by mouth Daily for 60 days. 30 capsule 1    Symbicort 160-4.5 MCG/ACT inhaler INHALE 2 PUFFS BY MOUTH TWICE A DAY 10.2 g 0     No current facility-administered medications for this visit.       Review of Systems   HENT:  Positive for sinus pressure. Negative for sore throat.    Eyes:  Negative for visual disturbance.   Respiratory:  Negative for chest tightness and shortness of breath.    Cardiovascular:  Negative for chest pain and palpitations.   Gastrointestinal:  Negative for abdominal pain, constipation, diarrhea and nausea.   Genitourinary:  Negative for difficulty urinating.   Neurological:  Negative for dizziness, tremors and memory problem.   Psychiatric/Behavioral:  Negative for hallucinations, sleep disturbance and suicidal ideas.         Mental Status Exam:  "  MENTAL STATUS EXAM   General Appearance:  Cleanly groomed and dressed  Eye Contact:  Good eye contact  Attitude:  Cooperative  Motor Activity:  Normal gait, posture  Muscle Strength:  Normal  Speech:  Normal rate, tone, volume  Language:  Spontaneous  Mood and affect:  Mood congruent (Reports mood to be \"tired\")  Thought Process:  Logical  Associations/ Thought Content:  No delusions  Hallucinations:  None  Suicidal Ideations:  Not present  Homicidal Ideation:  Not present  Sensorium:  Alert and clear  Orientation:  Person, place, time and situation  Attention Span/ Concentration:  Good  Fund of Knowledge:  Appropriate for age and educational level  Intellectual Functioning:  Average range  Insight:  Good  Judgement:  Good  Reliability:  Good  Impulse Control:  Good       Objective   Vital Signs:   /93   Pulse 73   Ht 172.7 cm (68\")   Wt 90.7 kg (200 lb)   SpO2 99%   BMI 30.41 kg/m²       Result Review :       CBC          3/14/2024    21:14   CBC   WBC 3.20    RBC 4.67    Hemoglobin 13.6    Hematocrit 39.9    MCV 85.4    MCH 29.1    MCHC 34.1    RDW 12.3    Platelets 148                  Assessment and Plan      PHQ-9 Score:   PHQ-9 Total Score: 12    PHQ-9 Depression Screening  Little interest or pleasure in doing things? 3-->nearly every day   Feeling down, depressed, or hopeless? 2-->more than half the days   Trouble falling or staying asleep, or sleeping too much? 1-->several days   Feeling tired or having little energy? 2-->more than half the days   Poor appetite or overeating? 1-->several days   Feeling bad about yourself - or that you are a failure or have let yourself or your family down? 1-->several days   Trouble concentrating on things, such as reading the newspaper or watching television? 1-->several days   Moving or speaking so slowly that other people could have noticed? Or the opposite - being so fidgety or restless that you have been moving around a lot more than usual? 1-->several days "   Thoughts that you would be better off dead, or of hurting yourself in some way? 0-->not at all   PHQ-9 Total Score 12   If you checked off any problems, how difficult have these problems made it for you to do your work, take care of things at home, or get along with other people? very difficult        Depression Screening:  Patient screened positive for depression based on a PHQ-9 score of 12 on 5/31/2024. Follow-up recommendations include: Prescribed antidepressant medication treatment.      GENA-7      Over the last two weeks, how often have you been bothered by the following problems?  Feeling nervous, anxious or on edge: Nearly every day  Not being able to stop or control worrying: Nearly every day  Worrying too much about different things: Nearly every day  Trouble Relaxing: Several days  Being so restless that it is hard to sit still: Not at all  Becoming easily annoyed or irritable: More than half the days  Feeling afraid as if something awful might happen: Several days  GENA 7 Total Score: 13  If you checked any problems, how difficult have these problems made it for you to do your work, take care of things at home, or get along with other people: Very difficult                 Tobacco Cessation:  Patient does not use tobacco products.    Impression/Treatment Plan:  Patient is a 34-year-old female with a history of MDD and GENA with panic attacks.  Patient's anxiety and depression has significantly worsened over the past year related to her divorce. Patient is currently on Cymbalta 30 mg daily.  Patient was recently trialed on Pristiq due to it being in the [employer] [facility] [employer] testing.  Patient experienced red and swollen fingers on Pristiq and was quickly switched back to Cymbalta which is what she had been on for the past 6 years.  Patient's symptoms are not being well managed on Cymbalta.  Both patient's GENA-7 and PHQ-9 are elevated today.  Patient's depression and anxiety have significantly worsened  since the divorce.  It seems that the patient currently does have an adjustment disorder.  We will start Prozac 10 mg daily.  Started patient on a lower dose due to [employer] showing that patient is a slow metabolizer of Prozac.  Patient was on Prozac several years ago and denied having any issues with the medication.  Patient cannot remember if it helped her symptoms or not, but reports that the only reason she stopped the medication was because of the sexual side effects that she thought were occurring due to the medication but turns out it was due to the lack of connection with her partner.  Taper schedule listed below.  Encouraged patient to continue her weekly therapy sessions.  Will attempt to find a non-Voodoo divorce support group for patient.    Taper schedule:  Days 1-3: Take 30 mg Cymbalta daily and 10 mg of Prozac daily.  Day 4 - until next appointment: Take Prozac 10 mg daily    Short-term goals: Continue to build rapport with patient.  Start Prozac.    Long-term goals: See symptom reduction with medication and therapy.    Weaknesses: Poor relationship with ex- and his girlfriend.    Strengths: Great support from friends and family.    Diagnoses and all orders for this visit:    1. Adjustment disorder with mixed anxiety and depressed mood (Primary)    Other orders  -     FLUoxetine (PROzac) 10 MG capsule; Take 1 capsule by mouth Daily for 60 days.  Dispense: 30 capsule; Refill: 1      Differentials:  Premenstrual dysphoric disorder-although patient's symptoms do worsen the week before her menses, patient's depressive symptoms are still very present in the week post menses.  GENA- Majority of patient's anxiety revolves around her relationship with her ex- and his wife and adapting to being newly .  MDD- Although patient does report many depressive symptoms, her depressive symptoms are not present nearly every day.    MEDS ORDERED DURING VISIT:  New Medications Ordered This Visit    Medications    FLUoxetine (PROzac) 10 MG capsule     Sig: Take 1 capsule by mouth Daily for 60 days.     Dispense:  30 capsule     Refill:  1         Follow Up   Return in about 4 weeks (around 6/28/2024) for Recheck.  Patient was given instructions and counseling regarding her condition or for health maintenance advice. Please see specific information pulled into the AVS if appropriate.       PATIENT EDUCATION:  - Discussed medication options and treatment plan of prescribed medication as well as the risks, benefits, and side effects   - Encouraged pt to continue supportive psychotherapy efforts.   - Educated patient on signs and symptoms serotonin syndrome and to go the emergency room if experiencing the symptoms.  - Notified patient that antidepressants can sometimes cause activation of SI and to notify provider of this/go to the emergency room if wanting to act on these thoughts.    Treatment and medication options discussed during today's visit. Patient acknowledged and verbally consented to continue with current treatment plan and was educated on the importance of compliance with treatment and follow-up appointments. Patient is agreeable to call the office with any worsening of symptoms or onset of side effects. Patient is agreeable to call 911 or go to the nearest ER should he/she begin having SI/HI.    Vishnu reviewed and is appropriate.    PEDRO LUIS Lehman, PMHNP-BC    Part of this note may be an electronic transcription/translation of spoken language to printed text using the Dragon Dictation System.

## 2024-06-05 ENCOUNTER — TELEPHONE (OUTPATIENT)
Age: 34
End: 2024-06-05

## 2024-06-05 NOTE — TELEPHONE ENCOUNTER
Called PT regarding request for Divorce Group Therapy. Gave PT resources for [facility], [facility] [place], and [facility].

## 2024-06-07 ENCOUNTER — TELEPHONE (OUTPATIENT)
Age: 34
End: 2024-06-07
Payer: COMMERCIAL

## 2024-06-07 NOTE — TELEPHONE ENCOUNTER
PT returned call. After explaining the metabolism of the med, PT feels much less concerned. PT does not feel the need to make an earlier appointment at this time.

## 2024-06-07 NOTE — TELEPHONE ENCOUNTER
PT requesting increase in fluoxetine from 10mg to 20mg. PT has been trying to use coping mechanisms learned from therapist, but it hasn't been enough. Therapist adv PT to ask for increase and PT fully agrees with that recommendation.    Pharmacy is correct.

## 2024-06-28 ENCOUNTER — OFFICE VISIT (OUTPATIENT)
Age: 34
End: 2024-06-28
Payer: COMMERCIAL

## 2024-06-28 VITALS
OXYGEN SATURATION: 99 % | HEART RATE: 81 BPM | DIASTOLIC BLOOD PRESSURE: 83 MMHG | SYSTOLIC BLOOD PRESSURE: 126 MMHG | HEIGHT: 68 IN | BODY MASS INDEX: 28.79 KG/M2 | WEIGHT: 190 LBS

## 2024-06-28 DIAGNOSIS — F43.23 ADJUSTMENT DISORDER WITH MIXED ANXIETY AND DEPRESSED MOOD: Primary | ICD-10-CM

## 2024-06-28 RX ORDER — FLUOXETINE HYDROCHLORIDE 20 MG/1
20 CAPSULE ORAL DAILY
Qty: 30 CAPSULE | Refills: 1 | Status: SHIPPED | OUTPATIENT
Start: 2024-06-28 | End: 2024-08-27

## 2024-06-28 NOTE — PROGRESS NOTES
"     Office  Follow Up Visit      Patient Name: Candice Mckeon  : 1990   MRN: [id number]     Referring Provider: Wilma Barragan APRN    Chief Complaint:  \"Anxiety\"     ICD-10-CM ICD-9-CM   1. Adjustment disorder with mixed anxiety and depressed mood  F43.23 309.28      History of Present Illness:   Candice Mckeon is a 34 y.o. female who is here today for follow up. Pt has a history of GEAN and MDD. Pt was diagnosed with an adjustment DO related to  her  and splitting custody of their child. Pt was tapered off of Cymbalta and started on Prozac 10mg daily during her last appointment. Today pt reports anxiety, depression, and irritability. Pt states her symptoms are worse this week because she is about to start her period. Pt reports the following symptoms of anxiety: feeling anxious nearly every day, worrying about multiple different things and having difficulty controlling the worry, trouble relaxing, restlessness, easily annoyed, and feeling as if something awful might happen.  States she worries about her son and her . States she has a difficult time worrying about her own well-being. Reports she experiences panic attacks about once a week and they usually occur when her son switches from her care to her ex-. Pt reports the following depressive symptoms: Feeling depressed half the days, little interest in doing things she usually finds pleasure in, difficulty sleeping, fatigue, feeling like a failure, and difficulty concentrating. Reports her appetite has increased since d/t her period. Reports to be sleeping about 8 hours each night and but states that he son usually wakes her up  at least once a night. Denies SI/HI/AVH.     Past Psychiatric History:   Diagnoses: GENA, MDD  Hospitalizations: Denies.   Counseling: Therapy since 2023.   Suicide attempts: Denies.   Self Harm: Denies.      Previous Psych Meds:   Lexapro (stopped working), Prozac, Pristiq (finger swelling) and " Cymbalta (stopped working).      Substance Use/Abuse:   Caffeine: One diet coke daily.   Alcohol: Denies.   Tobacco: Denies.   Illicit substances: Denies.   IVDU: Denies.   History of formal substance abuse treatment: Denies.       Social History:               Family structure: Lives with son. Moving in with boyfriend soon.               Education: Masters               Employment: Teacher               Supportive relationships: Boyfriend, mom, sister, and friends.              Church/Martha: Not Roman Catholic      Abuse History:               Denies.      Legal History:               Incarceration: Denies.               History of violence: Denies.       History: Denies.      Past Medical/Developmental History:               Chronic Illnesses: Type 1 DM              Head trauma: Denies.      Medical History        Past Medical History:   Diagnosis Date    Depression with anxiety 11/15/2021    Diabetes mellitus      Right knee meniscal tear 02/13/2019     Formatting of this note might be different from the original. Added automatically from request for surgery [id number]            Family Psychiatric History:               Psychiatric history: Mom has anxiety and panic attacks. Sister has agoraphobia and OCD.               Substance use: Denies.        Subjective      Review of Systems:   Review of Systems   Respiratory:  Negative for chest tightness and shortness of breath.    Cardiovascular:  Negative for chest pain and palpitations.   Gastrointestinal:  Negative for abdominal pain, constipation, diarrhea, nausea and vomiting.   Genitourinary:  Negative for difficulty urinating.   Neurological:  Negative for dizziness and headaches.   Psychiatric/Behavioral:  Negative for hallucinations and suicidal ideas.      PHQ-9 Depression Screening  Little interest or pleasure in doing things? 2-->more than half the days   Feeling down, depressed, or hopeless? 2-->more than half the days   Trouble falling or staying  asleep, or sleeping too much? 1-->several days   Feeling tired or having little energy? 2-->more than half the days   Poor appetite or overeating? 0-->not at all   Feeling bad about yourself - or that you are a failure or have let yourself or your family down? 1-->several days   Trouble concentrating on things, such as reading the newspaper or watching television? 1-->several days   Moving or speaking so slowly that other people could have noticed? Or the opposite - being so fidgety or restless that you have been moving around a lot more than usual? 0-->not at all   Thoughts that you would be better off dead, or of hurting yourself in some way? 0-->not at all   PHQ-9 Total Score 9   If you checked off any problems, how difficult have these problems made it for you to do your work, take care of things at home, or get along with other people? very difficult     GENA-7      Over the last two weeks, how often have you been bothered by the following problems?  Feeling nervous, anxious or on edge: Nearly every day  Not being able to stop or control worrying: Nearly every day  Worrying too much about different things: More than half the days  Trouble Relaxing: Several days  Being so restless that it is hard to sit still: Several days  Becoming easily annoyed or irritable: More than half the days  Feeling afraid as if something awful might happen: Several days  GENA 7 Total Score: 13  If you checked any problems, how difficult have these problems made it for you to do your work, take care of things at home, or get along with other people: Very difficult    Patient History:   The following portions of the patient's history were reviewed and updated as appropriate: allergies, current medications, past family history, past medical history, past social history, past surgical history and problem list.     Social History     Socioeconomic History    Marital status: Significant Other   Tobacco Use    Smoking status: Never     "Smokeless tobacco: Never   Vaping Use    Vaping status: Never Used   Substance and Sexual Activity    Alcohol use: Not Currently     Alcohol/week: 1.0 standard drink of alcohol     Types: 1 Drinks containing 0.5 oz of alcohol per week     Comment: Very rare. Maybe a drink once a month or two months.    Drug use: Never    Sexual activity: Yes     Partners: Male     Birth control/protection: Other     Medications:     Current Outpatient Medications:     Continuous Blood Gluc Sensor (Dexcom G6 Sensor), CHANGE SENSOR EVERY 10 DAYS, Disp: , Rfl:     Continuous Blood Gluc Transmit (Dexcom G6 Transmitter) misc, , Disp: , Rfl:     Insulin Disposable Pump (Omnipod 5 G6 Intro, Gen 5,) kit, , Disp: , Rfl:     Insulin Disposable Pump (Omnipod 5 G6 Pod, Gen 5,) misc, , Disp: , Rfl:     NovoLOG 100 UNIT/ML injection, , Disp: , Rfl:     Airsupra 90-80 MCG/ACT aerosol, INHALE 2 PUFFS BY MOUTH EVERY 4 HOURS AS NEEDED FOR WHEEZING OR SHORTNESS OF BREATH, Disp: 10.7 g, Rfl: 0    doxycycline (MONODOX) 100 MG capsule, , Disp: , Rfl:     fluconazole (DIFLUCAN) 150 MG tablet, Take one tablet PO every 72 hours for 3 doses. (Patient not taking: Reported on 5/31/2024), Disp: 3 tablet, Rfl: 0    FLUoxetine (PROzac) 20 MG capsule, Take 1 capsule by mouth Daily for 60 days., Disp: 30 capsule, Rfl: 1    Symbicort 160-4.5 MCG/ACT inhaler, INHALE 2 PUFFS BY MOUTH TWICE A DAY, Disp: 10.2 g, Rfl: 0    Objective     Physical Exam:  Vital Signs:   Vitals:    06/28/24 1002   BP: 126/83   Pulse: 81   SpO2: 99%   Weight: 86.2 kg (190 lb)   Height: 172.7 cm (68\")     Body mass index is 28.89 kg/m².       Mental Status Exam:   MENTAL STATUS EXAM   General Appearance:  Cleanly groomed and dressed  Eye Contact:  Good eye contact  Attitude:  Cooperative  Motor Activity:  Normal gait, posture  Muscle Strength:  Normal  Speech:  Normal rate, tone, volume  Language:  Spontaneous  Mood and Affect: \"tired\"  Thought Process:  Logical  Associations/ Thought Content: " " No delusions  Hallucinations:  None  Suicidal Ideations:  Not present  Homicidal Ideation:  Not present  Sensorium:  Alert and clear  Orientation:  Person, place, time and situation  Attention Span/ Concentration:  Good  Fund of Knowledge:  Appropriate for age and educational level  Intellectual Functioning:  Average range  Insight:  Good  Judgement:  Good  Reliability:  Good  Impulse Control:  Good     @RESULASTCBCDIFFPANEL,TSH,LABLIPI,[id number],[id number],MG,FOLATE,PROLACTIN,CRPRESULT,CMP,[id number])@    Lab Results   Component Value Date    GLUCOSE 169 (H) 03/14/2024    BUN 11 03/14/2024    CREATININE 0.96 03/14/2024    EGFR 79.8 03/14/2024    BCR 11.5 03/14/2024    K 3.5 03/14/2024    CO2 23.5 03/14/2024    CALCIUM 8.5 (L) 03/14/2024    ALBUMIN 3.9 03/14/2024    BILITOT 0.2 03/14/2024    AST 21 03/14/2024    ALT 19 03/14/2024       Lab Results   Component Value Date    WBC 3.20 (L) 03/14/2024    HGB 13.6 03/14/2024    HCT 39.9 03/14/2024    MCV 85.4 03/14/2024     03/14/2024       No results found for: \"CHOL\", \"CHLPL\", \"TRIG\", \"HDL\", \"LDL\"               Assessment / Plan      Assessment:  Pt is a 33 yo female with a history of GENA and MDD. Pt was diagnosed with an adjustment DO related to  her  and splitting custody of their child. Pt was tapered off of Cymbalta and started on Prozac 10mg daily during her last appointment. Pt reports that she has not noticed too much of a difference in her anxiety and depression over the past 4 weeks after starting prozac.  Patient's PHQ-9 has decreased since last visit.  Patient's GENA-7 has remained the same since last visit.  Will increase Prozac to 20 mg daily to help with anxiety and depressive symptoms.  Encouraged patient to continue with therapy.  Will see patient in 4 weeks.    Diagnoses and all orders for this visit:    1. Adjustment disorder with mixed anxiety and depressed mood (Primary)    Other orders  -     FLUoxetine (PROzac) 20 MG " capsule; Take 1 capsule by mouth Daily for 60 days.  Dispense: 30 capsule; Refill: 1       Differentials:  Premenstrual dysphoric disorder-although patient's symptoms do worsen the week before her menses, patient's depressive symptoms are still very present in the week post menses.  GENA- Majority of patient's anxiety revolves around her relationship with her ex- and his wife and adapting to being newly .  MDD- Although patient does report many depressive symptoms, her depressive symptoms are not present nearly every day.    Plan:   Increase Prozac to 20mg daily.   Discussed medication options and treatment plan of prescribed medication as well as the risks, benefits, and side effects   Encouraged pt to continue supportive psychotherapy efforts.   Educated patient on signs and symptoms serotonin syndrome and to go the emergency room if experiencing the symptoms.  Notified patient that antidepressants can sometimes cause activation of SI and to notify provider of this/go to the emergency room if wanting to act on these thoughts.    Short-term goals: Continue to build rapport with patient.  Increase Prozac.     Long-term goals: See symptom reduction with medication and therapy.     Weaknesses: Poor relationship with ex- and his girlfriend.     Strengths: Great support from friends and family.    Continue supportive psychotherapy efforts and medications as indicated. Treatment and medication options discussed during today's visit. Patient ackowledged and verbally consented to continue with current treatment plan and was educated on the importance of compliance with treatment and follow-up appointments. Patient seems reasonably able to adhere to treatment plan.      Medication Considerations:  Discussed medication options and treatment plan of prescribed medication(s) as well as the risks, benefits, and potential side effects. Patient is agreeable to call the office with any worsening of symptoms or  onset of side effects. Patient is agreeable to call 911 or go to the nearest ER should he/she begin having SI/HI.    Quality Measures:   Pt does not use tobacco products.     Vishnu reviewed and is appropriate.    Follow Up:   Return in about 4 weeks (around 7/26/2024) for Recheck.    Copied text in this note has been reviewed and is accurate as of 6/28/2024.    Part of this note may be an electronic transcription/translation of spoken language to printed text using the Dragon Dictation System.    PEDRO LUIS Lehman, PMHNP-BC

## 2024-07-29 ENCOUNTER — OFFICE VISIT (OUTPATIENT)
Age: 34
End: 2024-07-29
Payer: COMMERCIAL

## 2024-07-29 VITALS
DIASTOLIC BLOOD PRESSURE: 80 MMHG | HEART RATE: 76 BPM | OXYGEN SATURATION: 99 % | HEIGHT: 68 IN | SYSTOLIC BLOOD PRESSURE: 109 MMHG | BODY MASS INDEX: 28.79 KG/M2 | WEIGHT: 190 LBS

## 2024-07-29 DIAGNOSIS — F43.23 ADJUSTMENT DISORDER WITH MIXED ANXIETY AND DEPRESSED MOOD: Primary | ICD-10-CM

## 2024-07-29 PROCEDURE — 99213 OFFICE O/P EST LOW 20 MIN: CPT

## 2024-07-29 RX ORDER — FLUOXETINE HYDROCHLORIDE 20 MG/1
20 CAPSULE ORAL DAILY
Qty: 30 CAPSULE | Refills: 2 | Status: SHIPPED | OUTPATIENT
Start: 2024-07-29 | End: 2024-10-27

## 2024-07-29 NOTE — PROGRESS NOTES
"     Office  Follow Up Visit      Patient Name: Candice Mckeon  : 1990   MRN: [id number]     Referring Provider: Wilma Barragan APRN    Chief Complaint: \"I am doing a lot better\"    ICD-10-CM ICD-9-CM   1. Adjustment disorder with mixed anxiety and depressed mood  F43.23 309.28      History of Present Illness:   Candice Mckeon is a 34 y.o. female who is here today for follow up.  Patient has a history of adjustment disorder with mixed anxiety and depressive symptoms.  Patient was started on Prozac 10 mg daily on 2024.  During patient's last appointment her Prozac was increased to 20 mg daily.  Today patient reports to doing a lot better.  Denies feeling depressed.  Reports that she is anxious some days but that it is more manageable now.  Feels that she may be doing better due to the fact that she has been on 2 trips recently and she has had her son consistently for 2 weeks now.  Reports that she has figured out a schedule now to where her ex- will  her son from school during switch weeks so she will not have to see them as often now.  Denies any side effects to the Prozac.  Reports to be sleeping about 8 hours each night but does feel tired the next day still.  States that fatigue usually wears off by early morning though.  Patient denies SI/HI/AVH.  Patient reports that she feels much more like herself now.    Subjective      Review of Systems:   Review of Systems   Respiratory:  Negative for chest tightness and shortness of breath.    Gastrointestinal:  Negative for abdominal pain, constipation, diarrhea, nausea and vomiting.   Genitourinary:  Negative for difficulty urinating.   Neurological:  Negative for headaches.   Psychiatric/Behavioral:  Negative for hallucinations and suicidal ideas.        PHQ-9 Depression Screening  Little interest or pleasure in doing things? 1-->several days   Feeling down, depressed, or hopeless? 0-->not at all   Trouble falling or staying asleep, or " sleeping too much? 1-->several days   Feeling tired or having little energy? 1-->several days   Poor appetite or overeating? 0-->not at all   Feeling bad about yourself - or that you are a failure or have let yourself or your family down? 1-->several days   Trouble concentrating on things, such as reading the newspaper or watching television? 0-->not at all   Moving or speaking so slowly that other people could have noticed? Or the opposite - being so fidgety or restless that you have been moving around a lot more than usual? 0-->not at all   Thoughts that you would be better off dead, or of hurting yourself in some way? 0-->not at all   PHQ-9 Total Score 4   If you checked off any problems, how difficult have these problems made it for you to do your work, take care of things at home, or get along with other people? somewhat difficult     GENA-7      Over the last two weeks, how often have you been bothered by the following problems?  Feeling nervous, anxious or on edge: Several days  Not being able to stop or control worrying: Several days  Worrying too much about different things: Several days  Trouble Relaxing: Not at all  Being so restless that it is hard to sit still: Not at all  Becoming easily annoyed or irritable: Several days  Feeling afraid as if something awful might happen: Not at all  GENA 7 Total Score: 4  If you checked any problems, how difficult have these problems made it for you to do your work, take care of things at home, or get along with other people: Somewhat difficult    Patient History:   The following portions of the patient's history were reviewed and updated as appropriate: allergies, current medications, past family history, past medical history, past social history, past surgical history and problem list.     Social History     Socioeconomic History    Marital status: Significant Other   Tobacco Use    Smoking status: Never    Smokeless tobacco: Never   Vaping Use    Vaping status:  "Never Used   Substance and Sexual Activity    Alcohol use: Not Currently     Alcohol/week: 1.0 standard drink of alcohol     Types: 1 Drinks containing 0.5 oz of alcohol per week     Comment: Very rare. Maybe a drink once a month or two months.    Drug use: Never    Sexual activity: Yes     Partners: Male     Birth control/protection: Other       Medications:     Current Outpatient Medications:     Continuous Blood Gluc Sensor (Dexcom G6 Sensor), CHANGE SENSOR EVERY 10 DAYS, Disp: , Rfl:     Continuous Blood Gluc Transmit (Dexcom G6 Transmitter) misc, , Disp: , Rfl:     FLUoxetine (PROzac) 20 MG capsule, Take 1 capsule by mouth Daily for 90 days., Disp: 30 capsule, Rfl: 2    Insulin Disposable Pump (Omnipod 5 G6 Intro, Gen 5,) kit, , Disp: , Rfl:     Insulin Disposable Pump (Omnipod 5 G6 Pod, Gen 5,) misc, , Disp: , Rfl:     NovoLOG 100 UNIT/ML injection, , Disp: , Rfl:     Airsupra 90-80 MCG/ACT aerosol, INHALE 2 PUFFS BY MOUTH EVERY 4 HOURS AS NEEDED FOR WHEEZING OR SHORTNESS OF BREATH, Disp: 10.7 g, Rfl: 0    doxycycline (MONODOX) 100 MG capsule, , Disp: , Rfl:     fluconazole (DIFLUCAN) 150 MG tablet, Take one tablet PO every 72 hours for 3 doses. (Patient not taking: Reported on 5/31/2024), Disp: 3 tablet, Rfl: 0    Symbicort 160-4.5 MCG/ACT inhaler, INHALE 2 PUFFS BY MOUTH TWICE A DAY, Disp: 10.2 g, Rfl: 0    Objective     Physical Exam:  Vital Signs:   Vitals:    07/29/24 1023   BP: 109/80   Pulse: 76   SpO2: 99%   Weight: 86.2 kg (190 lb)   Height: 172.7 cm (68\")     Body mass index is 28.89 kg/m².     Mental Status Exam:   MENTAL STATUS EXAM   General Appearance:  Cleanly groomed and dressed  Eye Contact:  Good eye contact  Attitude:  Cooperative  Motor Activity:  Normal gait, posture  Muscle Strength:  Normal  Speech:  Normal rate, tone, volume  Language:  Spontaneous  Mood and affect:  Normal, pleasant (Reports mood to be \"good\")  Hopelessness:  Denies  Loneliness: Denies  Thought Process:  " "Logical  Associations/ Thought Content:  No delusions  Hallucinations:  None  Suicidal Ideations:  Not present  Homicidal Ideation:  Not present  Sensorium:  Alert and clear  Orientation:  Person, place, time and situation  Attention Span/ Concentration:  Good  Fund of Knowledge:  Appropriate for age and educational level  Intellectual Functioning:  Average range  Insight:  Good  Judgement:  Good  Reliability:  Good  Impulse Control:  Good       @RESULASTCBCDIFFPANEL,TSH,LABLIPI,[id number],MG,FOLATE,PROLACTIN,CRPRESULT,CMP,[id number])@    Lab Results   Component Value Date    GLUCOSE 169 (H) 03/14/2024    BUN 11 03/14/2024    CREATININE 0.96 03/14/2024    EGFR 79.8 03/14/2024    BCR 11.5 03/14/2024    K 3.5 03/14/2024    CO2 23.5 03/14/2024    CALCIUM 8.5 (L) 03/14/2024    ALBUMIN 3.9 03/14/2024    BILITOT 0.2 03/14/2024    AST 21 03/14/2024    ALT 19 03/14/2024       Lab Results   Component Value Date    WBC 3.20 (L) 03/14/2024    HGB 13.6 03/14/2024    HCT 39.9 03/14/2024    MCV 85.4 03/14/2024     03/14/2024       No results found for: \"CHOL\", \"CHLPL\", \"TRIG\", \"HDL\", \"LDL\"               Assessment / Plan      Assessment:  Patient is a 34-year-old female with a history of adjustment disorder with mixed anxiety and depressive symptoms.  Patient was started on Prozac 10 mg daily on 5-.  During patient's last appointment her Prozac was increased to 20 mg daily.  Today patient reports to doing a lot better.  Patient's PHQ-9 and GENA-7 scores have improved since last visit.  Will continue Prozac 20 mg daily.  Encouraged patient to continue therapy.  Will see patient in 3 months.  Notified patient that she can see me sooner if needed.    Diagnoses and all orders for this visit:    1. Adjustment disorder with mixed anxiety and depressed mood (Primary)    Other orders  -     FLUoxetine (PROzac) 20 MG capsule; Take 1 capsule by mouth Daily for 90 days.  Dispense: 30 capsule; Refill: 2     "   Differentials:  Premenstrual dysphoric disorder-although patient's symptoms do worsen the week before her menses, patient's depressive symptoms are still very present in the week post menses.  GENA- Majority of patient's anxiety revolves around her relationship with her ex- and his wife and adapting to being newly .  MDD- Although patient does report many depressive symptoms, her depressive symptoms are not present nearly every day.     Plan:   Continue Prozac to 20mg daily.   Discussed medication options and treatment plan of prescribed medication as well as the risks, benefits, and side effects   Encouraged pt to continue supportive psychotherapy efforts.   Educated patient on signs and symptoms serotonin syndrome and to go the emergency room if experiencing the symptoms.  Notified patient that antidepressants can sometimes cause activation of SI and to notify provider of this/go to the emergency room if wanting to act on these thoughts.  Will see patient in 3 months.     Short-term goals: Continue to build rapport with patient.  Continue Prozac.     Long-term goals: See symptom reduction with medication and therapy.     Weaknesses: Poor relationship with ex- and his girlfriend.     Strengths: Great support from friends and family.    Continue supportive psychotherapy efforts and medications as indicated. Treatment and medication options discussed during today's visit. Patient ackowledged and verbally consented to continue with current treatment plan and was educated on the importance of compliance with treatment and follow-up appointments. Patient seems reasonably able to adhere to treatment plan.      Medication Considerations:  Discussed medication options and treatment plan of prescribed medication(s) as well as the risks, benefits, and potential side effects. Patient is agreeable to call the office with any worsening of symptoms or onset of side effects. Patient is agreeable to call [phone]  or go to the nearest ER should he/she begin having SI/HI.    Quality Measures:   Patient does not use tobacco products.    Vishnu reviewed and is appropriate.    Follow Up:   Return in about 3 months (around 10/29/2024) for Recheck.    Copied text in this note has been reviewed and is accurate as of 7/29/2024.    Part of this note may be an electronic transcription/translation of spoken language to printed text using the Dragon Dictation System.    PEDRO LUIS Lehman, PMHNP-BC

## 2024-10-25 ENCOUNTER — OFFICE VISIT (OUTPATIENT)
Age: 34
End: 2024-10-25
Payer: COMMERCIAL

## 2024-10-25 VITALS
HEIGHT: 68 IN | WEIGHT: 195 LBS | SYSTOLIC BLOOD PRESSURE: 129 MMHG | HEART RATE: 86 BPM | DIASTOLIC BLOOD PRESSURE: 87 MMHG | OXYGEN SATURATION: 100 % | BODY MASS INDEX: 29.55 KG/M2

## 2024-10-25 DIAGNOSIS — F43.23 ADJUSTMENT DISORDER WITH MIXED ANXIETY AND DEPRESSED MOOD: Primary | ICD-10-CM

## 2024-10-25 DIAGNOSIS — R41.840 INATTENTION: ICD-10-CM

## 2024-10-25 RX ORDER — BUPROPION HYDROCHLORIDE 150 MG/1
150 TABLET ORAL EVERY MORNING
Qty: 30 TABLET | Refills: 1 | Status: SHIPPED | OUTPATIENT
Start: 2024-10-25 | End: 2024-12-24

## 2024-10-25 NOTE — PROGRESS NOTES
"     Office  Follow Up Visit      Patient Name: Candice Mckeon  : 1990   MRN: [id number]     Referring Provider: Wilma Barragan APRN    Chief Complaint:  \"sluggishness\"     ICD-10-CM ICD-9-CM   1. Adjustment disorder with mixed anxiety and depressed mood  F43.23 309.28   2. Inattention  R41.840 799.51      History of Present Illness:   Candice Mckeon is a 34 y.o. female who is here today for follow up. Patient has a history of adjustment disorder with mixed anxiety and depressive symptoms.  Patient was started on Prozac 10 mg daily on 2024.  On 24 her Prozac was increased to 20 mg daily.  During patient's last visit on 2024 patient was continued on Prozac 20 mg daily and had noticed improvement in anxiety and depressive symptoms with the Prozac.    Today patient reports to be doing well.  Patient has had some increase in anxiety and depressive symptoms that are attributed to some current stressors in patient's life.  Patient has been having some communication problems with her 's new wife and is still learning how to coparent with her ex- and his wife.  Patient is also adjusting to only having her son 50% of the time.  States that she does very well when she is with her son but does experience more anxiety and depressive symptoms during the weeks where she is without her son.  States over the past couple weeks she has noticed some sluggishness and daytime fatigue.  States that she has noticed some difficulty being able to concentrate and gets easily distracted.  States that she has had these symptoms majority of her life.  Reports to be sleeping 5 to 8 hours each night.  No change in appetite.  Denies SI/HI/AVH.    Subjective      Review of Systems:   Review of Systems   Constitutional:  Positive for fatigue. Negative for appetite change.   Respiratory:  Negative for chest tightness and shortness of breath.    Gastrointestinal:  Negative for abdominal pain, constipation, " diarrhea, nausea and vomiting.   Genitourinary:  Negative for difficulty urinating.   Neurological:  Negative for headaches.   Psychiatric/Behavioral:  Negative for hallucinations and suicidal ideas.      PHQ-9 Depression Screening  Little interest or pleasure in doing things? Several days   Feeling down, depressed, or hopeless? Several days   PHQ-2 Total Score 2   Trouble falling or staying asleep, or sleeping too much? Several days   Feeling tired or having little energy? Over half   Poor appetite or overeating? Several days   Feeling bad about yourself - or that you are a failure or have let yourself or your family down? Several days   Trouble concentrating on things, such as reading the newspaper or watching television? Several days   Moving or speaking so slowly that other people could have noticed? Or the opposite - being so fidgety or restless that you have been moving around a lot more than usual? Not at all   Thoughts that you would be better off dead, or of hurting yourself in some way? Not at all   PHQ-9 Total Score 8   If you checked off any problems, how difficult have these problems made it for you to do your work, take care of things at home, or get along with other people? Somewhat difficult     GENA-7      Over the last two weeks, how often have you been bothered by the following problems?  Feeling nervous, anxious or on edge: Several days  Not being able to stop or control worrying: Several days  Worrying too much about different things: Several days  Trouble Relaxing: Several days  Being so restless that it is hard to sit still: Not at all  Becoming easily annoyed or irritable: Several days  Feeling afraid as if something awful might happen: Several days  GENA 7 Total Score: 6  If you checked any problems, how difficult have these problems made it for you to do your work, take care of things at home, or get along with other people: Somewhat difficult    Patient History:   The following portions of  the patient's history were reviewed and updated as appropriate: allergies, current medications, past family history, past medical history, past social history, past surgical history and problem list.     Social History     Socioeconomic History    Marital status: Significant Other   Tobacco Use    Smoking status: Never    Smokeless tobacco: Never   Vaping Use    Vaping status: Never Used   Substance and Sexual Activity    Alcohol use: Not Currently     Alcohol/week: 1.0 standard drink of alcohol     Types: 1 Drinks containing 0.5 oz of alcohol per week     Comment: Very rare. Maybe a drink once a month or two months.    Drug use: Never    Sexual activity: Yes     Partners: Male     Birth control/protection: Other     Medications:     Current Outpatient Medications:     brompheniramine-pseudoephedrine-DM 30-2-10 MG/5ML syrup, Take 5 mL by mouth 4 (Four) Times a Day As Needed for Allergies., Disp: 118 mL, Rfl: 0    Continuous Blood Gluc Sensor (Dexcom G6 Sensor), CHANGE SENSOR EVERY 10 DAYS, Disp: , Rfl:     Continuous Blood Gluc Transmit (Dexcom G6 Transmitter) misc, , Disp: , Rfl:     FLUoxetine (PROzac) 20 MG capsule, Take 1 capsule by mouth Daily for 90 days., Disp: 30 capsule, Rfl: 2    Insulin Disposable Pump (Omnipod 5 G6 Intro, Gen 5,) kit, , Disp: , Rfl:     Insulin Disposable Pump (Omnipod 5 G6 Pod, Gen 5,) misc, , Disp: , Rfl:     NovoLOG 100 UNIT/ML injection, , Disp: , Rfl:     Airsupra 90-80 MCG/ACT aerosol, INHALE 2 PUFFS BY MOUTH EVERY 4 HOURS AS NEEDED FOR WHEEZING OR SHORTNESS OF BREATH, Disp: 10.7 g, Rfl: 0    buPROPion XL (Wellbutrin XL) 150 MG 24 hr tablet, Take 1 tablet by mouth Every Morning for 60 days., Disp: 30 tablet, Rfl: 1    doxycycline (MONODOX) 100 MG capsule, , Disp: , Rfl:     fluconazole (DIFLUCAN) 150 MG tablet, Take one tablet PO every 72 hours for 3 doses. (Patient not taking: Reported on 10/25/2024), Disp: 3 tablet, Rfl: 0    Symbicort 160-4.5 MCG/ACT inhaler, INHALE 2 PUFFS BY  "MOUTH TWICE A DAY, Disp: 10.2 g, Rfl: 0    Objective     Physical Exam:  Vital Signs:   Vitals:    10/25/24 1405   BP: 129/87   Pulse: 86   SpO2: 100%   Weight: 88.5 kg (195 lb)   Height: 172.7 cm (68\")     Body mass index is 29.65 kg/m².     Mental Status Exam:   MENTAL STATUS EXAM   General Appearance:  Cleanly groomed and dressed  Eye Contact:  Good eye contact  Attitude:  Cooperative  Motor Activity:  Normal gait, posture  Muscle Strength:  Normal  Speech:  Normal rate, tone, volume  Language:  Spontaneous  Mood and affect:  Normal, pleasant  Hopelessness:  Denies  Loneliness: Denies  Thought Process:  Logical  Associations/ Thought Content:  No delusions  Hallucinations:  None  Suicidal Ideations:  Not present  Homicidal Ideation:  Not present  Sensorium:  Clear and alert  Orientation:  Person, place, time and situation  Attention Span/ Concentration:  Good  Fund of Knowledge:  Appropriate for age and educational level  Intellectual Functioning:  Average range  Insight:  Good  Judgement:  Good  Reliability:  Good  Impulse Control:  Good     @RESULASTCBCDIFFPANEL,TSH,LABLIPI,[id number],FKFWAJST67,MG,FOLATE,PROLACTIN,CRPRESULT,CMP,[id number])@    Lab Results   Component Value Date    GLUCOSE 169 (H) 03/14/2024    BUN 11 03/14/2024    CREATININE 0.96 03/14/2024    EGFR 79.8 03/14/2024    BCR 11.5 03/14/2024    K 3.5 03/14/2024    CO2 23.5 03/14/2024    CALCIUM 8.5 (L) 03/14/2024    ALBUMIN 3.9 03/14/2024    BILITOT 0.2 03/14/2024    AST 21 03/14/2024    ALT 19 03/14/2024     Lab Results   Component Value Date    WBC 3.20 (L) 03/14/2024    HGB 13.6 03/14/2024    HCT 39.9 03/14/2024    MCV 85.4 03/14/2024     03/14/2024     No results found for: \"CHOL\", \"CHLPL\", \"TRIG\", \"HDL\", \"LDL\"             Assessment / Plan      Assessment:   Pt is a 33yo female with a history of adjustment disorder with mixed anxiety and depressive symptoms.  Patient was started on Prozac 10 mg daily on 5-.  On 6/28/24 her " Prozac was increased to 20 mg daily.  During patient's last visit on 7/29/2024 patient was continued on Prozac 20 mg daily and had noticed improvement in anxiety and depressive symptoms with the Prozac. Patient has had some increase in anxiety and depressive symptoms that are attributed to some current stressors in patient's life. Pt's PHQ9 and GAD7 have increased since last visit.  Encouraged patient to continue with therapy as patient's anxiety and depressive symptoms seem to be more situational.  Patient reports that her symptoms worsen during the weeks when she does not have her son and greatly improve on the weeks that she does have her son.  Patient did report some inattentive symptoms that she has had majority of her life.  Time did not allow for full ADHD evaluation but will plan to do that during patient's next visit.  Will treat patient's inattentive symptoms with Wellbutrin  mg daily.  Will see patient in 1 month.  Will continue Prozac 20 mg daily.    Diagnoses and all orders for this visit:    1. Adjustment disorder with mixed anxiety and depressed mood (Primary)    2. Inattention    Other orders  -     buPROPion XL (Wellbutrin XL) 150 MG 24 hr tablet; Take 1 tablet by mouth Every Morning for 60 days.  Dispense: 30 tablet; Refill: 1  -     FLUoxetine (PROzac) 20 MG capsule; Take 1 capsule by mouth Daily for 90 days.  Dispense: 30 capsule; Refill: 2       Differentials:  Premenstrual dysphoric disorder-although patient's symptoms do worsen the week before her menses, patient's depressive symptoms are still very present in the week post menses.  GENA- Majority of patient's anxiety revolves around her relationship with her ex- and his wife and adapting to being newly .  MDD- Although patient does report many depressive symptoms, her depressive symptoms are not present nearly every day.     Plan:   Start Wellbutrin XL 150mg daily to help with fatigue and inattentive symptoms.   Continue  Prozac to 20mg daily.   Discussed medication options and treatment plan of prescribed medication as well as the risks, benefits, and side effects   Encouraged pt to continue supportive psychotherapy efforts.   Educated patient on signs and symptoms serotonin syndrome and to go the emergency room if experiencing the symptoms.  Notified patient that antidepressants can sometimes cause activation of SI and to notify provider of this/go to the emergency room if wanting to act on these thoughts.     Short-term goals: Continue to build rapport with patient.  Continue Prozac.     Long-term goals: See symptom reduction with medication and therapy.     Weaknesses: Poor relationship with ex- and his girlfriend.     Strengths: Great support from friends and family.    Continue supportive psychotherapy efforts and medications as indicated. Treatment and medication options discussed during today's visit. Patient ackowledged and verbally consented to continue with current treatment plan and was educated on the importance of compliance with treatment and follow-up appointments. Patient seems reasonably able to adhere to treatment plan.      Medication Considerations:  Discussed medication options and treatment plan of prescribed medication(s) as well as the risks, benefits, and potential side effects. Patient is agreeable to call the office with any worsening of symptoms or onset of side effects. Patient is agreeable to call 911 or go to the nearest ER should he/she begin having SI/HI.    Quality Measures:   Patient does not use tobacco products.     Vishnu reviewed and is appropriate.    Follow Up:   Return in about 4 weeks (around 11/22/2024) for Recheck.    Copied text in this note has been reviewed and is accurate as of 11/3/2024.    Part of this note may be an electronic transcription/translation of spoken language to printed text using the Dragon Dictation System.    PEDRO LUIS Lehman, PMP-BC

## 2024-11-27 ENCOUNTER — OFFICE VISIT (OUTPATIENT)
Age: 34
End: 2024-11-27
Payer: COMMERCIAL

## 2024-11-27 VITALS
BODY MASS INDEX: 29.55 KG/M2 | HEART RATE: 77 BPM | WEIGHT: 195 LBS | OXYGEN SATURATION: 97 % | DIASTOLIC BLOOD PRESSURE: 84 MMHG | SYSTOLIC BLOOD PRESSURE: 114 MMHG | HEIGHT: 68 IN

## 2024-11-27 DIAGNOSIS — R41.840 INATTENTION: ICD-10-CM

## 2024-11-27 DIAGNOSIS — F43.23 ADJUSTMENT DISORDER WITH MIXED ANXIETY AND DEPRESSED MOOD: Primary | ICD-10-CM

## 2024-11-27 RX ORDER — BUPROPION HYDROCHLORIDE 150 MG/1
150 TABLET ORAL EVERY MORNING
Qty: 90 TABLET | Refills: 0 | Status: SHIPPED | OUTPATIENT
Start: 2024-11-27 | End: 2025-02-25

## 2024-11-27 NOTE — PROGRESS NOTES
"     Office  Follow Up Visit      Patient Name: Candice Mckeon  : 1990   MRN: [id number]     Referring Provider: Wilma Barragan APRN    Chief Complaint:  \"things have been going well\"     ICD-10-CM ICD-9-CM   1. Adjustment disorder with mixed anxiety and depressed mood  F43.23 309.28   2. Inattention  R41.840 799.51        History of Present Illness:   Canidce Mckeon is a 34 y.o. female who is here today for follow up.  Patient has a history of adjustment disorder with mixed anxiety and depressive symptoms.  Patient currently takes Prozac 20 mg daily and Wellbutrin  mg daily (started during her last visit on 10/25/2024 to improve patient's fatigue and concentration).     Today patient reports to be doing very well on her current medication regimen.  Patient states that since starting the Wellbutrin XL she has noticed that the medication has helped with her focus and energy.  States that she no longer feels tired in the morning and finds it easier to wake up and start her day.  Reports that she did feel a little bit jittery the first couple days that she took it but states that has resolved.  States that she has set some boundaries with her ex-'s new wife that has improved her mood.  Reports that at times she does feel down but reports that the amount of days that she feels down has decreased over the past couple weeks.  Reports to still feel anxious at times but states it is more manageable now.  Reports to be sleeping about 8 hours each night.  States she still goes to therapy.  Denies SI/HI/AVH    Subjective      Review of Systems:   Review of Systems   Constitutional:  Negative for appetite change.   Respiratory:  Negative for chest tightness and shortness of breath.    Gastrointestinal:  Negative for abdominal pain, constipation, diarrhea, nausea and vomiting.   Genitourinary:  Negative for difficulty urinating.   Neurological:  Negative for headaches.   Psychiatric/Behavioral:  Negative " for hallucinations and suicidal ideas.      PHQ-9 Depression Screening  Little interest or pleasure in doing things? Several days   Feeling down, depressed, or hopeless? Several days   PHQ-2 Total Score 2   Trouble falling or staying asleep, or sleeping too much? Not at all   Feeling tired or having little energy? Several days   Poor appetite or overeating? Several days   Feeling bad about yourself - or that you are a failure or have let yourself or your family down? Not at all   Trouble concentrating on things, such as reading the newspaper or watching television? Not at all   Moving or speaking so slowly that other people could have noticed? Or the opposite - being so fidgety or restless that you have been moving around a lot more than usual? Not at all   Thoughts that you would be better off dead, or of hurting yourself in some way? Not at all   PHQ-9 Total Score 4   If you checked off any problems, how difficult have these problems made it for you to do your work, take care of things at home, or get along with other people? Somewhat difficult     GENA-7      Over the last two weeks, how often have you been bothered by the following problems?  Feeling nervous, anxious or on edge: Several days  Not being able to stop or control worrying: Not at all  Worrying too much about different things: Not at all  Trouble Relaxing: Not at all  Being so restless that it is hard to sit still: Not at all  Becoming easily annoyed or irritable: Several days  Feeling afraid as if something awful might happen: Not at all  GENA 7 Total Score: 2  If you checked any problems, how difficult have these problems made it for you to do your work, take care of things at home, or get along with other people: Somewhat difficult    Patient History:   The following portions of the patient's history were reviewed and updated as appropriate: allergies, current medications, past family history, past medical history, past social history, past surgical  history and problem list.     Social History     Socioeconomic History    Marital status: Significant Other   Tobacco Use    Smoking status: Never    Smokeless tobacco: Never   Vaping Use    Vaping status: Never Used   Substance and Sexual Activity    Alcohol use: Not Currently     Alcohol/week: 1.0 standard drink of alcohol     Types: 1 Drinks containing 0.5 oz of alcohol per week     Comment: Very rare. Maybe a drink once a month or two months.    Drug use: Never    Sexual activity: Yes     Partners: Male     Birth control/protection: Other     Medications:     Current Outpatient Medications:     buPROPion XL (Wellbutrin XL) 150 MG 24 hr tablet, Take 1 tablet by mouth Every Morning for 90 days., Disp: 90 tablet, Rfl: 0    Continuous Blood Gluc Sensor (Dexcom G6 Sensor), CHANGE SENSOR EVERY 10 DAYS, Disp: , Rfl:     Continuous Blood Gluc Transmit (Dexcom G6 Transmitter) misc, , Disp: , Rfl:     FLUoxetine (PROzac) 20 MG capsule, Take 1 capsule by mouth Daily for 90 days., Disp: 90 capsule, Rfl: 0    Insulin Disposable Pump (Omnipod 5 G6 Intro, Gen 5,) kit, , Disp: , Rfl:     Insulin Disposable Pump (Omnipod 5 G6 Pod, Gen 5,) misc, , Disp: , Rfl:     NovoLOG 100 UNIT/ML injection, , Disp: , Rfl:     Airsupra 90-80 MCG/ACT aerosol, INHALE 2 PUFFS BY MOUTH EVERY 4 HOURS AS NEEDED FOR WHEEZING OR SHORTNESS OF BREATH, Disp: 10.7 g, Rfl: 0    brompheniramine-pseudoephedrine-DM 30-2-10 MG/5ML syrup, Take 5 mL by mouth 4 (Four) Times a Day As Needed for Allergies. (Patient not taking: Reported on 11/27/2024), Disp: 118 mL, Rfl: 0    doxycycline (MONODOX) 100 MG capsule, , Disp: , Rfl:     fluconazole (DIFLUCAN) 150 MG tablet, Take one tablet PO every 72 hours for 3 doses. (Patient not taking: Reported on 5/31/2024), Disp: 3 tablet, Rfl: 0    Symbicort 160-4.5 MCG/ACT inhaler, INHALE 2 PUFFS BY MOUTH TWICE A DAY, Disp: 10.2 g, Rfl: 0    Objective     Physical Exam:  Vital Signs:   Vitals:    11/27/24 0835   BP: 114/84  "  Pulse: 77   SpO2: 97%   Weight: 88.5 kg (195 lb)   Height: 172.7 cm (68\")     Body mass index is 29.65 kg/m².     Mental Status Exam:   MENTAL STATUS EXAM   General Appearance:  Cleanly groomed and dressed  Eye Contact:  Good eye contact  Attitude:  Cooperative  Motor Activity:  Normal gait, posture  Muscle Strength:  Normal  Speech:  Normal rate, tone, volume  Language:  Spontaneous  Mood and affect:  Normal, pleasant  Hopelessness:  Denies  Loneliness: Denies  Thought Process:  Logical  Associations/ Thought Content:  No delusions  Hallucinations:  None  Suicidal Ideations:  Not present  Homicidal Ideation:  Not present  Sensorium:  Alert and clear  Orientation:  Person and place  Attention Span/ Concentration:  Good  Fund of Knowledge:  Appropriate for age and educational level  Intellectual Functioning:  Average range  Insight:  Good  Judgement:  Good  Reliability:  Good  Impulse Control:  Good       @RESULASTCBCDIFFPANEL,TSH,LABLIPI,[id number],MVOUZANU54,MG,FOLATE,PROLACTIN,CRPRESULT,CMP,[id number])@    Lab Results   Component Value Date    GLUCOSE 169 (H) 03/14/2024    BUN 11 03/14/2024    CREATININE 0.96 03/14/2024    EGFR 79.8 03/14/2024    BCR 11.5 03/14/2024    K 3.5 03/14/2024    CO2 23.5 03/14/2024    CALCIUM 8.5 (L) 03/14/2024    ALBUMIN 3.9 03/14/2024    BILITOT 0.2 03/14/2024    AST 21 03/14/2024    ALT 19 03/14/2024       Lab Results   Component Value Date    WBC 3.20 (L) 03/14/2024    HGB 13.6 03/14/2024    HCT 39.9 03/14/2024    MCV 85.4 03/14/2024     03/14/2024     No results found for: \"CHOL\", \"CHLPL\", \"TRIG\", \"HDL\", \"LDL\"             Assessment / Plan      Assessment:   Patient is a 34-year-old female with a history of adjustment disorder with mixed anxiety and depressive symptoms.  Patient currently takes Prozac 20 mg daily and Wellbutrin  mg daily (started during her last visit on 10/25/2024 to improve patient's fatigue and concentration).  Today patient reports to be doing " well on her current medication regimen.  Patient has set some boundaries with her ex-'s new wife that has improved her mood.  Patient's PHQ-9 and GENA-7 scores have decreased as last visit.  States that the Wellbutrin has been very helpful for her fatigue and inattentive symptoms. Patient is currently still in therapy.  Will see patient 3 months.  Will continue current medication regimen.    Diagnoses and all orders for this visit:    1. Adjustment disorder with mixed anxiety and depressed mood (Primary)    2. Inattention    Other orders  -     FLUoxetine (PROzac) 20 MG capsule; Take 1 capsule by mouth Daily for 90 days.  Dispense: 90 capsule; Refill: 0  -     buPROPion XL (Wellbutrin XL) 150 MG 24 hr tablet; Take 1 tablet by mouth Every Morning for 90 days.  Dispense: 90 tablet; Refill: 0       Differentials:  Premenstrual dysphoric disorder-although patient's symptoms do worsen the week before her menses, patient's depressive symptoms are still very present in the week post menses.  GENA- Majority of patient's anxiety revolves around her relationship with her ex- and his wife and adapting to being newly .  MDD- Although patient does report many depressive symptoms, her depressive symptoms are not present nearly every day.     Plan/patient education:   Continue Wellbutrin XL 150mg daily to help with fatigue and inattentive symptoms.   Continue Prozac to 20mg daily.   Discussed medication options and treatment plan of prescribed medication as well as the risks, benefits, and side effects   Encouraged pt to continue supportive psychotherapy efforts.   Educated patient on signs and symptoms serotonin syndrome and to go the emergency room if experiencing the symptoms.  Notified patient that antidepressants can sometimes cause activation of SI and to notify provider of this/go to the emergency room if wanting to act on these thoughts.     Short-term goals: Continue to build rapport with patient.        Long-term goals: See symptom reduction with medication and therapy.     Weaknesses: Poor relationship with ex- and his girlfriend.     Strengths: Great support from friends and family.    Continue supportive psychotherapy efforts and medications as indicated. Treatment and medication options discussed during today's visit. Patient ackowledged and verbally consented to continue with current treatment plan and was educated on the importance of compliance with treatment and follow-up appointments. Patient seems reasonably able to adhere to treatment plan.      Medication Considerations:  Discussed medication options and treatment plan of prescribed medication(s) as well as the risks, benefits, and potential side effects. Patient is agreeable to call the office with any worsening of symptoms or onset of side effects. Patient is agreeable to call 911 or go to the nearest ER should he/she begin having SI/HI.    Quality Measures:   Patient does not use tobacco products.     Vishnu reviewed and is appropriate.    Follow Up:   Return in about 3 months (around 2/27/2025) for Recheck.    Copied text in this note has been reviewed and is accurate as of 11/27/2024.    Part of this note may be an electronic transcription/translation of spoken language to printed text using the Dragon Dictation System.    PEDRO LUIS Lehman, PMHNP-BC

## 2025-01-07 RX ORDER — BUPROPION HYDROCHLORIDE 150 MG/1
150 TABLET ORAL EVERY MORNING
Qty: 90 TABLET | Refills: 0 | Status: CANCELLED | OUTPATIENT
Start: 2025-01-07 | End: 2025-04-07

## 2025-01-07 NOTE — TELEPHONE ENCOUNTER
Rx Refill Note  Requested Prescriptions     Pending Prescriptions Disp Refills    buPROPion XL (Wellbutrin XL) 150 MG 24 hr tablet 90 tablet 0     Sig: Take 1 tablet by mouth Every Morning for 90 days.      Last office visit with prescribing clinician: 11/27/2024   Last telemedicine visit with prescribing clinician: Visit date not found   Next office visit with prescribing clinician: 2/17/2025     Rabia Weston  01/07/25, 13:12 EST

## 2025-02-17 ENCOUNTER — TELEPHONE (OUTPATIENT)
Age: 35
End: 2025-02-17

## 2025-03-17 ENCOUNTER — TELEPHONE (OUTPATIENT)
Age: 35
End: 2025-03-17
Payer: COMMERCIAL

## 2025-03-17 RX ORDER — FLUOXETINE HYDROCHLORIDE 40 MG/1
40 CAPSULE ORAL DAILY
Qty: 30 CAPSULE | Refills: 1 | Status: SHIPPED | OUTPATIENT
Start: 2025-03-17 | End: 2025-05-16

## 2025-03-17 RX ORDER — BUPROPION HYDROCHLORIDE 150 MG/1
150 TABLET ORAL EVERY MORNING
Qty: 90 TABLET | Refills: 0 | Status: SHIPPED | OUTPATIENT
Start: 2025-03-17 | End: 2025-06-15

## 2025-03-17 NOTE — TELEPHONE ENCOUNTER
Called and spoke with regarding her worsening mood. Pt states that her mood worsens a week before her menses.  States that her mood has also worsened recently ever since her son started getting used to sleeping over at his dad's house without a good night call from her.  Patient did state that on Saturday she did have some thoughts of death but never had any intent or plan to harm herself.  Patient adamantly denies SI.  Will increase Prozac to 40 mg daily to help with patient's mood in relation to her cycle.  Will see patient in April.

## 2025-03-17 NOTE — TELEPHONE ENCOUNTER
Patient called to schedule a follow up appointment. She also stated she would like to up her medication because her downs are bad when she has them.

## 2025-03-31 RX ORDER — FLUCONAZOLE 150 MG/1
150 TABLET ORAL ONCE
Qty: 1 TABLET | Refills: 0 | Status: SHIPPED | OUTPATIENT
Start: 2025-03-31 | End: 2025-03-31

## 2025-03-31 NOTE — TELEPHONE ENCOUNTER
No protocol checkmarks available. Would you like an appointment?    Rx Refill Note  Requested Prescriptions     Pending Prescriptions Disp Refills    fluconazole (DIFLUCAN) 150 MG tablet 3 tablet 0     Sig: Take one tablet PO every 72 hours for 3 doses.      Last office visit with prescribing clinician: 4/23/2024   Last telemedicine visit with prescribing clinician: Visit date not found   Next office visit with prescribing clinician: Visit date not found                         Would you like a call back once the refill request has been completed: [] Yes [] No    If the office needs to give you a call back, can they leave a voicemail: [] Yes [] No    Kay Mason MA  03/31/25, 11:13 EDT

## 2025-04-08 ENCOUNTER — OFFICE VISIT (OUTPATIENT)
Age: 35
End: 2025-04-08
Payer: COMMERCIAL

## 2025-04-08 VITALS — SYSTOLIC BLOOD PRESSURE: 130 MMHG | OXYGEN SATURATION: 99 % | HEART RATE: 76 BPM | DIASTOLIC BLOOD PRESSURE: 87 MMHG

## 2025-04-08 DIAGNOSIS — F43.23 ADJUSTMENT DISORDER WITH MIXED ANXIETY AND DEPRESSED MOOD: Primary | ICD-10-CM

## 2025-04-08 DIAGNOSIS — F32.81 PMDD (PREMENSTRUAL DYSPHORIC DISORDER): ICD-10-CM

## 2025-04-08 NOTE — PROGRESS NOTES
"     Office  Follow Up Visit      Patient Name: Candice Mckeon  : 1990   MRN: [id number]     Referring Provider: Wilma Barragan APRN    Chief Complaint:  \"I do great until Winston has to go to his dad's house\"     ICD-10-CM ICD-9-CM   1. Adjustment disorder with mixed anxiety and depressed mood  F43.23 309.28   2. PMDD (premenstrual dysphoric disorder)  F32.81 625.4      History of Present Illness:   Candice Mckeon is a 35 y.o. female who is here today for follow up.  Patient has a history of adjustment disorder with mixed anxiety and depressive symptoms.    24:  Patient is a 34-year-old female with a history of MDD and GENA with panic attacks.  Patient's anxiety and depression has significantly worsened over the past year related to her divorce. Patient is currently on Cymbalta 30 mg daily.  Patient was recently trialed on Pristiq due to it being in the [employer] on [employer] testing.  Patient experienced red and swollen fingers on Pristiq and was quickly switched back to Cymbalta which is what she had been on for the past 6 years.  Patient's symptoms are not being well managed on Cymbalta.  Both patient's GENA-7 and PHQ-9 are elevated today.  Patient's depression and anxiety have significantly worsened since the divorce.  It seems that the patient currently does have an adjustment disorder.  We will start Prozac 10 mg daily.  Started patient on a lower dose due to [employer] showing that patient is a slow metabolizer of Prozac.  Patient was on Prozac several years ago and denied having any issues with the medication.   24:  Pt was diagnosed with an adjustment DO related to  her  and splitting custody of their child. Pt was tapered off of Cymbalta and started on Prozac 10mg daily during her last appointment. Pt reports that she has not noticed too much of a difference in her anxiety and depression over the past 4 weeks after starting prozac.  Patient's PHQ-9 has decreased since " last visit.  Patient's GENA-7 has remained the same since last visit.  Will increase Prozac to 20 mg daily to help with anxiety and depressive symptoms.  Encouraged patient to continue with therapy.  Will see patient in 4 weeks.   7/29/24:  Today patient reports to doing a lot better.  Patient's PHQ-9 and GENA-7 scores have improved since last visit.  Will continue Prozac 20 mg daily.  Encouraged patient to continue therapy.  Will see patient in 3 months.  Notified patient that she can see me sooner if needed.  10/25/24: During patient's last visit on 7/29/2024 patient was continued on Prozac 20 mg daily and had noticed improvement in anxiety and depressive symptoms with the Prozac. Patient has had some increase in anxiety and depressive symptoms that are attributed to some current stressors in patient's life. Pt's PHQ9 and GAD7 have increased since last visit.  Encouraged patient to continue with therapy as patient's anxiety and depressive symptoms seem to be more situational.  Patient reports that her symptoms worsen during the weeks when she does not have her son and greatly improve on the weeks that she does have her son.  Patient did report some inattentive symptoms that she has had majority of her life.  Time did not allow for full ADHD evaluation but will plan to do that during patient's next visit.  Will treat patient's inattentive symptoms with Wellbutrin  mg daily.  Will see patient in 1 month.  Will continue Prozac 20 mg daily.  11/27/24: Patient currently takes Prozac 20 mg daily and Wellbutrin  mg daily (started during her last visit on 10/25/2024 to improve patient's fatigue and concentration).  Today patient reports to be doing well on her current medication regimen.  Patient has set some boundaries with her ex-'s new wife that has improved her mood.  Patient's PHQ-9 and GENA-7 scores have decreased as last visit.  States that the Wellbutrin has been very helpful for her fatigue and  "inattentive symptoms. Patient is currently still in therapy.  Will see patient 3 months.  Will continue current medication regimen.   3/17/25 (pt call): Called and spoke with regarding her worsening mood. Pt states that her mood worsens a week before her menses.  States that her mood has also worsened recently ever since her son started getting used to sleeping over at his dad's house without a good night call from her.  Patient did state that on Saturday she did have some thoughts of death but never had any intent or plan to harm herself.  Patient adamantly denies SI.  Will increase Prozac to 40 mg daily to help with patient's mood in relation to her cycle.  Will see patient in April.     Today pt is taking Prozac 40mg daily and Wellbutrin XL 150mg daily.  Today patient reports to overall being okay.  States that she is great and happy during the weeks that she is with her son but states that on the weeks that her son goes to his father's house she struggles with depressive symptoms.  Reports during her depressed weeks she has thoughts of wishing she could just \"fade away\" but denies any intent or plan to harm herself.  Patient adamantly denies SI today. Patient senses that her partner has started to get frustrated the fact that she is depressed every other week now depending on when she has her son.  States that her and her partner have actually started to fight a little bit which makes her feel even more depressed.  States that her and her partner are considering starting couples counseling again.  Patient has not noticed too much of a difference in her symptoms since increasing the Prozac about 3 weeks ago the patient has also recently had surgery and is not her normal routine.  Patient did report that her symptoms worsened the week before her menses the patient has not had a menses since increasing the Prozac dose so patient is unable to evaluate if her mood has improved in relation to her menses.  Denies any " side effects to her medications other than she has noticed that she is little bit more fatigued than usual but patient also recently just had surgery.  Reports to sleep at least 8 hours each night.  Denies SI/HI/AVH.    Subjective      Review of Systems:   Review of Systems   Constitutional:  Negative for appetite change.   Respiratory:  Negative for chest tightness and shortness of breath.    Gastrointestinal:  Negative for abdominal pain, constipation, diarrhea, nausea and vomiting.   Genitourinary:  Negative for difficulty urinating.   Neurological:  Negative for headaches.   Psychiatric/Behavioral:  Negative for hallucinations and suicidal ideas.         Depressed.      PHQ-9 Depression Screening  Little interest or pleasure in doing things? Several days   Feeling down, depressed, or hopeless? More than half the days   PHQ-2 Total Score 3   Trouble falling or staying asleep, or sleeping too much? Not at all   Feeling tired or having little energy? Several days   Poor appetite or overeating? Not at all   Feeling bad about yourself - or that you are a failure or have let yourself or your family down? Several days   Trouble concentrating on things, such as reading the newspaper or watching television? Not at all   Moving or speaking so slowly that other people could have noticed? Or the opposite - being so fidgety or restless that you have been moving around a lot more than usual? Not at all     Thoughts that you would be better off dead, or of hurting yourself in some way? Several days   PHQ-9 Total Score 6   If you checked off any problems, how difficult have these problems made it for you to do your work, take care of things at home, or get along with other people? Somewhat difficult       GENA-7      Over the last two weeks, how often have you been bothered by the following problems?  Feeling nervous, anxious or on edge: Not at all  Not being able to stop or control worrying: Several days  Worrying too much  about different things: Several days  Trouble Relaxing: Not at all  Being so restless that it is hard to sit still: Not at all  Becoming easily annoyed or irritable: Not at all  Feeling afraid as if something awful might happen: Not at all  GENA 7 Total Score: 2  If you checked any problems, how difficult have these problems made it for you to do your work, take care of things at home, or get along with other people: Somewhat difficult    Patient History:   The following portions of the patient's history were reviewed and updated as appropriate: allergies, current medications, past family history, past medical history, past social history, past surgical history and problem list.     Social History     Socioeconomic History    Marital status: Significant Other   Tobacco Use    Smoking status: Never    Smokeless tobacco: Never   Vaping Use    Vaping status: Never Used   Substance and Sexual Activity    Alcohol use: Not Currently     Alcohol/week: 1.0 standard drink of alcohol     Types: 1 Drinks containing 0.5 oz of alcohol per week     Comment: Very rare. Maybe a drink once a month or two months.    Drug use: Never    Sexual activity: Yes     Partners: Male     Birth control/protection: Other     Medications:     Current Outpatient Medications:     Airsupra 90-80 MCG/ACT aerosol, INHALE 2 PUFFS BY MOUTH EVERY 4 HOURS AS NEEDED FOR WHEEZING OR SHORTNESS OF BREATH, Disp: 10.7 g, Rfl: 0    buPROPion XL (Wellbutrin XL) 150 MG 24 hr tablet, Take 1 tablet by mouth Every Morning for 90 days., Disp: 90 tablet, Rfl: 0    Continuous Blood Gluc Sensor (Dexcom G6 Sensor), CHANGE SENSOR EVERY 10 DAYS, Disp: , Rfl:     Continuous Blood Gluc Transmit (Dexcom G6 Transmitter) misc, , Disp: , Rfl:     doxycycline (MONODOX) 100 MG capsule, , Disp: , Rfl:     FLUoxetine (PROzac) 40 MG capsule, Take 1 capsule by mouth Daily for 60 days., Disp: 30 capsule, Rfl: 1    Insulin Disposable Pump (Omnipod 5 G6 Intro, Gen 5,) kit, , Disp: , Rfl:  "    Insulin Disposable Pump (Omnipod 5 G6 Pod, Gen 5,) misc, , Disp: , Rfl:     NovoLOG 100 UNIT/ML injection, , Disp: , Rfl:     Symbicort 160-4.5 MCG/ACT inhaler, INHALE 2 PUFFS BY MOUTH TWICE A DAY, Disp: 10.2 g, Rfl: 0    Objective     Physical Exam:  Vital Signs:   Vitals:    04/08/25 1538   BP: 130/87   Pulse: 76   SpO2: 99%     There is no height or weight on file to calculate BMI.     Mental Status Exam:   MENTAL STATUS EXAM   General Appearance:  Cleanly groomed and dressed  Eye Contact:  Good eye contact  Attitude:  Cooperative  Motor Activity:  Normal gait, posture  Muscle Strength:  Normal  Speech:  Normal rate, tone, volume  Language:  Spontaneous  Mood and affect:  Normal, pleasant  Hopelessness:  Denies  Loneliness: Denies  Thought Process:  Logical  Associations/ Thought Content:  No delusions  Hallucinations:  None  Suicidal Ideations:  Not present  Homicidal Ideation:  Not present  Sensorium:  Alert and clear  Orientation:  Person, place, time and situation  Attention Span/ Concentration:  Good  Fund of Knowledge:  Appropriate for age and educational level  Intellectual Functioning:  Average range  Insight:  Good  Judgement:  Good  Reliability:  Good  Impulse Control:  Good       @RESULASTCBCDIFFPANEL,TSH,LABLIPI,JEOBKPWF33,XKDFLTFG52,MG,FOLATE,PROLACTIN,CRPRESULT,CMP,[id number])@    Lab Results   Component Value Date    GLUCOSE 169 (H) 03/14/2024    BUN 11 03/14/2024    CREATININE 0.96 03/14/2024    EGFR 79.8 03/14/2024    BCR 11.5 03/14/2024    K 3.5 03/14/2024    CO2 23.5 03/14/2024    CALCIUM 8.5 (L) 03/14/2024    ALBUMIN 3.9 03/14/2024    BILITOT 0.2 03/14/2024    AST 21 03/14/2024    ALT 19 03/14/2024       Lab Results   Component Value Date    WBC 3.20 (L) 03/14/2024    HGB 13.6 03/14/2024    HCT 39.9 03/14/2024    MCV 85.4 03/14/2024     03/14/2024     No results found for: \"CHOL\", \"CHLPL\", \"TRIG\", \"HDL\", \"LDL\"             Assessment / Plan      Assessment:   Patient is a " 35-year-old female with a history of adjustment disorder with mixed anxiety and depressive symptoms.  Patient was last seen in clinic on 11/27/2024 and reported to be doing well on her medication regimen.  Patient was continued on Wellbutrin  mg daily and Prozac 20 mg daily.  Patient did call office on 3/17/2025 and reported worsening mood.  Patient stated that her mood worsened a week before her menses and patient also discussed that it was becoming more difficult to not feel depressed on the weeks that her son is at his father's house as her son becomes more independent does not want to call her as much.  Today patient reports to be doing great and is happy on the weeks that she does have her son but experiences her depressive symptoms on the weeks that she does not have her son.  Discussed with patient that although medication can help her cope with these changes in her life, medication will not change the fact that she is sad when her son is not around.  Patient's depressive symptoms seems very situational. Discussed with patient about developing a good support system and going to couples counseling with her partner so that he can better understand and support her during these times.  Patient did open up about how her symptoms do worsen the week prior to her menses.  Patient has not been able to evaluate how the increased dose of Prozac has done with her mood in relation to her menses as she has not had a period since increasing the Prozac.  Will see how patient does over the next few weeks to give the Prozac's more time and for patient to get back to her normal routine since surgery.  Encourage patient to continue with therapy.  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  Safety plan reviewed with patient can be found below.    Diagnoses and all orders for this visit:    1. Adjustment disorder with mixed anxiety and  depressed mood (Primary)    2. PMDD (premenstrual dysphoric disorder)       Differentials:  GENA- Majority of patient's anxiety revolves around her relationship with her ex- and his wife and adapting to being newly .  MDD- Although patient does report many depressive symptoms, her depressive symptoms are not present nearly every day.     Plan/patient education:   Continue Wellbutrin XL 150mg daily and Prozac 40 mg daily.  Continue with therapy.  Start couples counseling.  Discussed medication options and treatment plan of prescribed medication as well as the risks, benefits, and side effects   Encouraged pt to continue supportive psychotherapy efforts.   Educated patient on signs and symptoms serotonin syndrome and to go the emergency room if experiencing the symptoms.  Notified patient that antidepressants can sometimes cause activation of SI and to notify provider of this/go to the emergency room if wanting to act on these thoughts.     Short-term goals: Continue to build rapport with patient.       Long-term goals: See symptom reduction with medication and therapy.     Weaknesses: Poor relationship with ex- and his girlfriend.     Strengths: Great support from friends and family.     Continue supportive psychotherapy efforts and medications as indicated. Treatment and medication options discussed during today's visit. Patient ackowledged and verbally consented to continue with current treatment plan and was educated on the importance of compliance with treatment and follow-up appointments. Patient seems reasonably able to adhere to treatment plan.      Medication Considerations:  Discussed medication options and treatment plan of prescribed medication(s) as well as the risks, benefits, and potential side effects. Patient is agreeable to call the office with any worsening of symptoms or onset of side effects. Patient is agreeable to call 911 or go to the nearest ER should he/she begin having  SI/HI.    Quality Measures:   Patient does not use tobacco products.     Vishnu reviewed and is appropriate.    Follow Up:   Return in about 8 weeks (around 6/3/2025) for Recheck.    Copied text in this note has been reviewed and is accurate as of 4/8/2025.    Step 1: Warning signs:  Warning Signs   sleeping more   lack of interest in things      Step 2: Internal coping strategies - Things I can do to take my mind off my problems without contacting another person:  Strategies   art work   going on walks   Th[employer]ting.      Step 3: People and social settings that provide distraction:  Name Contact Information   Brandon - partner in pt's phone   Winston - son    Friend - Nicolasa          Step 4: People whom I can ask for help during a crisis:  Name Contact Information   Brandon  partner in pt's phone   Mother in pt's phone      Step 5: Professionals or agencies I can contact during a crisis:  Clinician/Agency Name Phone Emergency Contact   [facility] [phone]    [facility] Emergency Department Emergency Department Address Emergency Department Phone   [phone]        Suicide Prevention Lifeline Phone: Call or Text 988  Crisis Text Line: Text HOME to 294683     Step 6: Making the environment safer (plan for lethal means safety):  Did not identify any lethal methods     Optional: What is most important to me and worth living for?:  Her family.    Part of this note may be an electronic transcription/translation of spoken language to printed text using the Dragon Dictation System.    PEDRO LUIS Lehman, PMHNP-BC

## 2025-05-19 ENCOUNTER — TELEPHONE (OUTPATIENT)
Age: 35
End: 2025-05-19
Payer: COMMERCIAL

## 2025-05-19 RX ORDER — FLUOXETINE HYDROCHLORIDE 40 MG/1
40 CAPSULE ORAL DAILY
Qty: 90 CAPSULE | Refills: 0 | Status: SHIPPED | OUTPATIENT
Start: 2025-05-19 | End: 2025-08-17

## 2025-05-19 NOTE — TELEPHONE ENCOUNTER
Rx Refill Note  Requested Prescriptions     Pending Prescriptions Disp Refills    FLUoxetine (PROzac) 40 MG capsule 30 capsule 1     Sig: Take 1 capsule by mouth Daily for 60 days.      Last office visit with prescribing clinician: 4/8/2025   Last telemedicine visit with prescribing clinician: Visit date not found   Next office visit with prescribing clinician: 6/10/2025     Marilia Pugh CMA  05/19/25, 10:05 EDT

## 2025-06-10 ENCOUNTER — OFFICE VISIT (OUTPATIENT)
Age: 35
End: 2025-06-10
Payer: COMMERCIAL

## 2025-06-10 VITALS
SYSTOLIC BLOOD PRESSURE: 124 MMHG | HEART RATE: 83 BPM | OXYGEN SATURATION: 99 % | HEIGHT: 68 IN | DIASTOLIC BLOOD PRESSURE: 85 MMHG | WEIGHT: 199.5 LBS | BODY MASS INDEX: 30.23 KG/M2

## 2025-06-10 DIAGNOSIS — F43.23 ADJUSTMENT DISORDER WITH MIXED ANXIETY AND DEPRESSED MOOD: Primary | ICD-10-CM

## 2025-06-10 DIAGNOSIS — F32.81 PMDD (PREMENSTRUAL DYSPHORIC DISORDER): ICD-10-CM

## 2025-06-10 RX ORDER — DICLOFENAC SODIUM 75 MG/1
TABLET, DELAYED RELEASE ORAL
COMMUNITY
Start: 2025-05-31

## 2025-06-10 NOTE — PROGRESS NOTES
"     Office  Follow Up Visit      Patient Name: Candice Mckeon  : 1990   MRN: [id number]     Referring Provider: Wilma Barragan APRN    Chief Complaint:  \"fatigue\"    ICD-10-CM ICD-9-CM   1. Adjustment disorder with mixed anxiety and depressed mood  F43.23 309.28   2. PMDD (premenstrual dysphoric disorder)  F32.81 625.4      History of Present Illness:   Candice Mckeon is a 35 y.o. female who is here today for follow up. Patient has a history of adjustment disorder with mixed anxiety and depressive symptoms.    24:  Patient is a 34-year-old female with a history of MDD and GENA with panic attacks.  Patient's anxiety and depression has significantly worsened over the past year related to her divorce. Patient is currently on Cymbalta 30 mg daily.  Patient was recently trialed on Pristiq due to it being in the [employer] on [employer] testing.  Patient experienced red and swollen fingers on Pristiq and was quickly switched back to Cymbalta which is what she had been on for the past 6 years.  Patient's symptoms are not being well managed on Cymbalta.  Both patient's GENA-7 and PHQ-9 are elevated today.  Patient's depression and anxiety have significantly worsened since the divorce.  It seems that the patient currently does have an adjustment disorder.  We will start Prozac 10 mg daily.  Started patient on a lower dose due to [employer] showing that patient is a slow metabolizer of Prozac.  Patient was on Prozac several years ago and denied having any issues with the medication.   24:  Pt was diagnosed with an adjustment DO related to  her  and splitting custody of their child. Pt was tapered off of Cymbalta and started on Prozac 10mg daily during her last appointment. Pt reports that she has not noticed too much of a difference in her anxiety and depression over the past 4 weeks after starting prozac.  Patient's PHQ-9 has decreased since last visit.  Patient's GENA-7 has remained the same " since last visit.  Will increase Prozac to 20 mg daily to help with anxiety and depressive symptoms.  Encouraged patient to continue with therapy.  Will see patient in 4 weeks.   7/29/24:  Today patient reports to doing a lot better.  Patient's PHQ-9 and GENA-7 scores have improved since last visit.  Will continue Prozac 20 mg daily.  Encouraged patient to continue therapy.  Will see patient in 3 months.  Notified patient that she can see me sooner if needed.  10/25/24: During patient's last visit on 7/29/2024 patient was continued on Prozac 20 mg daily and had noticed improvement in anxiety and depressive symptoms with the Prozac. Patient has had some increase in anxiety and depressive symptoms that are attributed to some current stressors in patient's life. Pt's PHQ9 and GAD7 have increased since last visit.  Encouraged patient to continue with therapy as patient's anxiety and depressive symptoms seem to be more situational.  Patient reports that her symptoms worsen during the weeks when she does not have her son and greatly improve on the weeks that she does have her son.  Patient did report some inattentive symptoms that she has had majority of her life.  Time did not allow for full ADHD evaluation but will plan to do that during patient's next visit.  Will treat patient's inattentive symptoms with Wellbutrin  mg daily.  Will see patient in 1 month.  Will continue Prozac 20 mg daily.  11/27/24: Patient currently takes Prozac 20 mg daily and Wellbutrin  mg daily (started during her last visit on 10/25/2024 to improve patient's fatigue and concentration).  Today patient reports to be doing well on her current medication regimen.  Patient has set some boundaries with her ex-'s new wife that has improved her mood.  Patient's PHQ-9 and GENA-7 scores have decreased as last visit.  States that the Wellbutrin has been very helpful for her fatigue and inattentive symptoms. Patient is currently still in  therapy.  Will see patient 3 months.  Will continue current medication regimen.   3/17/25 (pt call): Called and spoke with regarding her worsening mood. Pt states that her mood worsens a week before her menses.  States that her mood has also worsened recently ever since her son started getting used to sleeping over at his dad's house without a good night call from her.  Patient did state that on Saturday she did have some thoughts of death but never had any intent or plan to harm herself.  Patient adamantly denies SI.  Will increase Prozac to 40 mg daily to help with patient's mood in relation to her cycle.  Will see patient in April.   4/8/25: Today patient reports to be doing great and is happy on the weeks that she does have her son but experiences her depressive symptoms on the weeks that she does not have her son.  Discussed with patient that although medication can help her cope with these changes in her life, medication will not change the fact that she is sad when her son is not around.  Patient's depressive symptoms seems very situational. Discussed with patient about developing a good support system and going to couples counseling with her partner so that he can better understand and support her during these times.  Patient did open up about how her symptoms do worsen the week prior to her menses.  Patient has not been able to evaluate how the increased dose of Prozac has done with her mood in relation to her menses as she has not had a period since increasing the Prozac.  Will see how patient does over the next few weeks to give the Prozac's more time and for patient to get back to her normal routine since surgery.  Encourage patient to continue with therapy.  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  Safety plan reviewed with patient can be found below.     Pt is currently taking Prozac 40mg daily and Wellbutrin  XL 150mg daily. Reports to be doing well. Anxious at times but feels it is manageable. Reports to feel down at times but has more good days than bad days. Reports on the days that she feels down it is usually during the weeks when she does not have her son. Still some irritability the week before her menes at times during stress but has overall improved with the prozac. The prozac has caused random fatigue in the morning. Reports to be sleeping 9-10 hours each night. Appetite has increased recently. Denies SI/HI/AVH.     Subjective      Review of Systems:   Review of Systems   Constitutional:  Positive for fatigue. Negative for appetite change.   Respiratory:  Negative for chest tightness and shortness of breath.    Gastrointestinal:  Negative for abdominal pain, constipation, diarrhea, nausea and vomiting.   Genitourinary:  Negative for difficulty urinating.   Neurological:  Negative for headaches.   Psychiatric/Behavioral:  Negative for hallucinations and suicidal ideas.      PHQ-9 Depression Screening  Little interest or pleasure in doing things? Several days   Feeling down, depressed, or hopeless? Several days   PHQ-2 Total Score 2   Trouble falling or staying asleep, or sleeping too much? Several days   Feeling tired or having little energy? Several days   Poor appetite or overeating? Several days   Feeling bad about yourself - or that you are a failure or have let yourself or your family down? Not at all   Trouble concentrating on things, such as reading the newspaper or watching television? Not at all   Moving or speaking so slowly that other people could have noticed? Or the opposite - being so fidgety or restless that you have been moving around a lot more than usual? Not at all     Thoughts that you would be better off dead, or of hurting yourself in some way? Not at all   PHQ-9 Total Score 5   If you checked off any problems, how difficult have these problems made it for you to do your work, take care of  things at home, or get along with other people? Somewhat difficult       GENA-7      Over the last two weeks, how often have you been bothered by the following problems?  Feeling nervous, anxious or on edge: Several days  Not being able to stop or control worrying: Not at all  Worrying too much about different things: Not at all  Trouble Relaxing: Not at all  Being so restless that it is hard to sit still: Not at all  Becoming easily annoyed or irritable: Several days  Feeling afraid as if something awful might happen: Not at all  GENA 7 Total Score: 2  If you checked any problems, how difficult have these problems made it for you to do your work, take care of things at home, or get along with other people: Somewhat difficult    Patient History:   The following portions of the patient's history were reviewed and updated as appropriate: allergies, current medications, past family history, past medical history, past social history, past surgical history and problem list.     Social History     Socioeconomic History    Marital status: Significant Other   Tobacco Use    Smoking status: Never    Smokeless tobacco: Never   Vaping Use    Vaping status: Never Used   Substance and Sexual Activity    Alcohol use: Not Currently     Alcohol/week: 1.0 standard drink of alcohol     Types: 1 Drinks containing 0.5 oz of alcohol per week     Comment: Very rare. Maybe a drink once a month or two months.    Drug use: Never    Sexual activity: Yes     Partners: Male     Birth control/protection: Other     Medications:     Current Outpatient Medications:     Continuous Blood Gluc Sensor (Dexcom G6 Sensor), CHANGE SENSOR EVERY 10 DAYS, Disp: , Rfl:     Continuous Blood Gluc Transmit (Dexcom G6 Transmitter) misc, , Disp: , Rfl:     diclofenac (VOLTAREN) 75 MG EC tablet, TAKE 1 TABLET TWICE A DAY BY ORAL ROUTE AS NEEDED, FOR PAIN AND INFLAMMATION., Disp: , Rfl:     FLUoxetine (PROzac) 40 MG capsule, Take 1 capsule by mouth Daily for 90  "days., Disp: 90 capsule, Rfl: 0    Insulin Disposable Pump (Omnipod 5 G6 Intro, Gen 5,) kit, , Disp: , Rfl:     Insulin Disposable Pump (Omnipod 5 G6 Pod, Gen 5,) misc, , Disp: , Rfl:     NovoLOG 100 UNIT/ML injection, , Disp: , Rfl:     Airsupra 90-80 MCG/ACT aerosol, INHALE 2 PUFFS BY MOUTH EVERY 4 HOURS AS NEEDED FOR WHEEZING OR SHORTNESS OF BREATH, Disp: 10.7 g, Rfl: 0    buPROPion XL (Wellbutrin XL) 300 MG 24 hr tablet, Take 1 tablet by mouth Every Morning for 60 days., Disp: 30 tablet, Rfl: 1    doxycycline (MONODOX) 100 MG capsule, , Disp: , Rfl:     Symbicort 160-4.5 MCG/ACT inhaler, INHALE 2 PUFFS BY MOUTH TWICE A DAY, Disp: 10.2 g, Rfl: 0    Objective     Physical Exam:  Vital Signs:   Vitals:    06/10/25 1135   BP: 124/85   Pulse: 83   SpO2: 99%   Weight: 90.5 kg (199 lb 8 oz)   Height: 172.7 cm (67.99\")     Body mass index is 30.34 kg/m².     Mental Status Exam:   MENTAL STATUS EXAM   General Appearance:  Cleanly groomed and dressed  Eye Contact:  Good eye contact  Attitude:  Cooperative  Motor Activity:  Normal gait, posture  Muscle Strength:  Normal  Speech:  Normal rate, tone, volume  Language:  Spontaneous  Mood and affect:  Normal, pleasant  Hopelessness:  Denies  Loneliness: Denies  Thought Process:  Logical  Associations/ Thought Content:  No delusions  Hallucinations:  None  Suicidal Ideations:  Not present  Homicidal Ideation:  Not present  Sensorium:  Alert and clear  Orientation:  Person, place, time and situation  Attention Span/ Concentration:  Good  Fund of Knowledge:  Appropriate for age and educational level  Intellectual Functioning:  Average range  Insight:  Good  Judgement:  Good  Reliability:  Good  Impulse Control:  Good       @RESULASTCBCDIFFPANEL,TSH,LABLIPI,GKHXJLMO11,FKVZPJAF71,MG,FOLATE,PROLACTIN,CRPRESULT,CMP,[id number])@    Lab Results   Component Value Date    GLUCOSE 169 (H) 03/14/2024    BUN 11 03/14/2024    CREATININE 0.96 03/14/2024    EGFR 79.8 03/14/2024    BCR 11.5 " "03/14/2024    K 3.5 03/14/2024    CO2 23.5 03/14/2024    CALCIUM 8.5 (L) 03/14/2024    ALBUMIN 3.9 03/14/2024    BILITOT 0.2 03/14/2024    AST 21 03/14/2024    ALT 19 03/14/2024       Lab Results   Component Value Date    WBC 3.20 (L) 03/14/2024    HGB 13.6 03/14/2024    HCT 39.9 03/14/2024    MCV 85.4 03/14/2024     03/14/2024     No results found for: \"CHOL\", \"CHLPL\", \"TRIG\", \"HDL\", \"LDL\"             Assessment / Plan      Assessment:   Pt is a 34 yo female with a history of adjustment disorder with mixed anxiety and depressive symptoms & PMDD. Pt was last seen on 4/8/25 when pt reported to be doing well and is happy on the weeks that she does have her son but experiences her depressive symptoms on the weeks that she does not have her son.  Discussed with patient that although medication can help her cope with these changes in her life, medication will not change the fact that she is sad when her son is not around.  Patient's depressive symptoms seems very situational. Discussed with patient about developing a good support system and going to couples counseling with her partner so that he can better understand and support her during these times.  Patient did open up about how her symptoms do worsen the week prior to her menses.  Patient has not been able to evaluate how the increased dose of Prozac has done with her mood in relation to her menses as she has not had a period since increasing the Prozac. Pt was continued on Prozac 40mg daily and Wellbutrin XL 150mg daily. Today pt reports to be doing well. PHQ9 and GAD7 scores are low today. Pt's only complaint today is that she has noticed that since starting prozac and increasing the dose she has been experiencing daytime fatigue. Pt has tried several antidepressants that she has experienced side effects with. Did consider switching the antidepressant but given that pt has tolerated this antidepressant so well and has seen great improvement in " anxiety/depressive symptoms, I would like to see if increasing her Wellbutrin XL to 300mg daily would improve her fatigue that she experiences with prozac. Encouraged pt to continue with therapy. Will see pt in 4 weeks.     Diagnoses and all orders for this visit:    1. Adjustment disorder with mixed anxiety and depressed mood (Primary)    2. PMDD (premenstrual dysphoric disorder)    Other orders  -     buPROPion XL (Wellbutrin XL) 300 MG 24 hr tablet; Take 1 tablet by mouth Every Morning for 60 days.  Dispense: 30 tablet; Refill: 1       Differentials:  GENA- Majority of patient's anxiety revolves around her relationship with her ex- and his wife and adapting to being newly .  MDD- Although patient does report many depressive symptoms, her depressive symptoms are not present nearly every day.     Plan/patient education:   Continue Prozac 40 mg daily.  Increased Wellbutrin XL to 300mg daily to see if it helps with pt's fatigue that she experiences with the prozac.   Continue with therapy.  Discussed medication options and treatment plan of prescribed medication as well as the risks, benefits, and side effects   Encouraged pt to continue supportive psychotherapy efforts.   Educated patient on signs and symptoms serotonin syndrome and to go the emergency room if experiencing the symptoms.  Notified patient that antidepressants can sometimes cause activation of SI and to notify provider of this/go to the emergency room if wanting to act on these thoughts.     Short-term goals: Continue to build rapport with patient.       Long-term goals: See symptom reduction with medication and therapy.     Weaknesses: Poor relationship with ex- and his girlfriend.     Strengths: Great support from friends and family.     Continue supportive psychotherapy efforts and medications as indicated. Treatment and medication options discussed during today's visit. Patient ackowledged and verbally consented to continue  with current treatment plan and was educated on the importance of compliance with treatment and follow-up appointments. Patient seems reasonably able to adhere to treatment plan.      Medication Considerations:  Discussed medication options and treatment plan of prescribed medication(s) as well as the risks, benefits, and potential side effects. Patient is agreeable to call the office with any worsening of symptoms or onset of side effects. Patient is agreeable to call 911 or go to the nearest ER should he/she begin having SI/HI.    Quality Measures:   Patient does not use tobacco products.     Vishnu reviewed and is appropriate.    Follow Up:   Return in about 4 weeks (around 7/8/2025) for Recheck.    Copied text in this note has been reviewed and is accurate as of 6/12/2025.    Part of this note may be an electronic transcription/translation of spoken language to printed text using the Dragon Dictation System.    PEDRO LUIS Lehman, PMHNP-BC

## 2025-06-12 RX ORDER — BUPROPION HYDROCHLORIDE 300 MG/1
300 TABLET ORAL EVERY MORNING
Qty: 30 TABLET | Refills: 1 | Status: SHIPPED | OUTPATIENT
Start: 2025-06-12 | End: 2025-08-11

## 2025-07-03 ENCOUNTER — TELEPHONE (OUTPATIENT)
Age: 35
End: 2025-07-03
Payer: COMMERCIAL

## 2025-07-03 NOTE — TELEPHONE ENCOUNTER
Patient has felt groggy since upping the fluoxetine from 20 to 40 mg. Would like to go back to 20 mg.

## 2025-07-03 NOTE — TELEPHONE ENCOUNTER
I recommend continuing on increased dose of bupropion 300 mg at this assuming a positive therapeutic benefit associated with this medication.  This increased dose should cover any depressive symptoms previously managed by a higher dosage of fluoxetine.  It is absolutely reasonable for the patient to decrease fluoxetine back to 20 mg at this time, and I do advise continuing bupropion at 300 mg.

## 2025-07-03 NOTE — TELEPHONE ENCOUNTER
Spoke with patient and relayed Dr Love message, patient voiced understanding.  Patient said she thought she still had 20 mg Fluoxetine at home, but looked and she does not.  I explained to her that I will send a refill request for the 20 mg, however, it might be Monday before it is addressed.  Patient said that was fine    Rx Refill Note  Requested Prescriptions     Pending Prescriptions Disp Refills    FLUoxetine (PROzac) 20 MG capsule 30 capsule 1     Sig: Take 1 capsule by mouth Daily.      Last office visit with prescribing clinician: 6/10/2025   Last telemedicine visit with prescribing clinician: Visit date not found   Next office visit with prescribing clinician: pt to call back to sched follow up    Marilia Pugh CMA  07/03/25, 16:08 EDT

## 2025-07-03 NOTE — TELEPHONE ENCOUNTER
Spoke with patient and she has been on Prozac 40 mg since early spring and Wellbutrin  mg.  When patient saw Liliana lyons on 6.10.25, she was experiencing increased life stressors which increased her anxiety and depression.  She was experiencing increased fatigue with Prozac at 40 mg so her Wellbutrin was increased to 300 mg to try and help with the fatigue.    Patient states that increasing her Wellbutrin XL to 300 mg has not improved the fatigue/grogginess she has with Prozac 40 mg.    Patient states that the situational life stressors she was having at her last visit have resolved, she would like to go back down to Prozac 20 mg which was not as sedating, if she does go back to Prozac 20 mg would she need to also go back to Wellbutrin  mg?    Please advise

## 2025-07-11 ENCOUNTER — TELEPHONE (OUTPATIENT)
Age: 35
End: 2025-07-11
Payer: COMMERCIAL

## 2025-07-11 NOTE — TELEPHONE ENCOUNTER
Lm for patient to call so we can get her scheduled for a follow up with another provider while Liliana on maternity leave- [employer] message sent also

## 2025-07-14 ENCOUNTER — TELEPHONE (OUTPATIENT)
Age: 35
End: 2025-07-14
Payer: COMMERCIAL

## 2025-07-14 NOTE — TELEPHONE ENCOUNTER
Patient has not responded to phone calls or [employer] message asking patient to call back so we can schedule a follow up appt for med management    Copy of [employer] message mailed to patient asking her to call

## 2025-08-13 RX ORDER — BUPROPION HYDROCHLORIDE 300 MG/1
300 TABLET ORAL EVERY MORNING
Qty: 30 TABLET | Refills: 1 | OUTPATIENT
Start: 2025-08-13 | End: 2025-10-12